# Patient Record
Sex: MALE | Race: WHITE | NOT HISPANIC OR LATINO | Employment: OTHER | URBAN - NONMETROPOLITAN AREA
[De-identification: names, ages, dates, MRNs, and addresses within clinical notes are randomized per-mention and may not be internally consistent; named-entity substitution may affect disease eponyms.]

---

## 2018-05-10 ENCOUNTER — HISTORICAL (OUTPATIENT)
Dept: ADMINISTRATIVE | Facility: HOSPITAL | Age: 58
End: 2018-05-10

## 2018-12-22 ENCOUNTER — HISTORICAL (OUTPATIENT)
Dept: ADMINISTRATIVE | Facility: HOSPITAL | Age: 58
End: 2018-12-22

## 2019-08-28 ENCOUNTER — HISTORICAL (OUTPATIENT)
Dept: ADMINISTRATIVE | Facility: HOSPITAL | Age: 59
End: 2019-08-28

## 2019-08-29 ENCOUNTER — HISTORICAL (OUTPATIENT)
Dept: ADMINISTRATIVE | Facility: HOSPITAL | Age: 59
End: 2019-08-29

## 2021-05-21 ENCOUNTER — HISTORICAL (OUTPATIENT)
Dept: ADMINISTRATIVE | Facility: HOSPITAL | Age: 61
End: 2021-05-21

## 2021-07-27 ENCOUNTER — HISTORICAL (OUTPATIENT)
Dept: ADMINISTRATIVE | Facility: HOSPITAL | Age: 61
End: 2021-07-27

## 2021-07-29 ENCOUNTER — HISTORICAL (OUTPATIENT)
Dept: ADMINISTRATIVE | Facility: HOSPITAL | Age: 61
End: 2021-07-29

## 2021-07-30 ENCOUNTER — HISTORICAL (OUTPATIENT)
Dept: ADMINISTRATIVE | Facility: HOSPITAL | Age: 61
End: 2021-07-30

## 2022-05-20 ENCOUNTER — HISTORICAL (OUTPATIENT)
Dept: ADMINISTRATIVE | Facility: HOSPITAL | Age: 62
End: 2022-05-20

## 2022-12-13 ENCOUNTER — HISTORICAL (OUTPATIENT)
Dept: ADMINISTRATIVE | Facility: HOSPITAL | Age: 62
End: 2022-12-13

## 2023-01-13 ENCOUNTER — HISTORICAL (OUTPATIENT)
Dept: ADMINISTRATIVE | Facility: HOSPITAL | Age: 63
End: 2023-01-13

## 2023-02-20 ENCOUNTER — LAB VISIT (OUTPATIENT)
Dept: LAB | Facility: HOSPITAL | Age: 63
End: 2023-02-20
Attending: UROLOGY
Payer: MEDICARE

## 2023-02-20 DIAGNOSIS — N40.1 BENIGN LOCALIZED PROSTATIC HYPERPLASIA WITH LOWER URINARY TRACT SYMPTOMS (LUTS): Primary | ICD-10-CM

## 2023-02-20 LAB
ANION GAP SERPL CALC-SCNC: 6 MEQ/L (ref 2–13)
BUN SERPL-MCNC: 9 MG/DL (ref 7–20)
CALCIUM SERPL-MCNC: 8.9 MG/DL (ref 8.4–10.2)
CHLORIDE SERPL-SCNC: 99 MMOL/L (ref 98–110)
CO2 SERPL-SCNC: 33 MMOL/L (ref 21–32)
CREAT SERPL-MCNC: 0.91 MG/DL (ref 0.66–1.25)
CREAT/UREA NIT SERPL: 10 (ref 12–20)
GFR SERPLBLD CREATININE-BSD FMLA CKD-EPI: >90 MLS/MIN/1.73/M2
GLUCOSE SERPL-MCNC: 186 MG/DL (ref 70–115)
POTASSIUM SERPL-SCNC: 4.5 MMOL/L (ref 3.5–5.1)
SODIUM SERPL-SCNC: 138 MMOL/L (ref 135–145)

## 2023-02-20 PROCEDURE — 36415 COLL VENOUS BLD VENIPUNCTURE: CPT

## 2023-02-20 PROCEDURE — 80048 BASIC METABOLIC PNL TOTAL CA: CPT

## 2023-04-24 ENCOUNTER — LAB VISIT (OUTPATIENT)
Dept: LAB | Facility: HOSPITAL | Age: 63
End: 2023-04-24
Attending: FAMILY MEDICINE
Payer: MEDICARE

## 2023-04-24 DIAGNOSIS — R33.8 ENLARGED PROSTATE WITH URINARY RETENTION: ICD-10-CM

## 2023-04-24 DIAGNOSIS — Z00.01 ENCOUNTER FOR GENERAL ADULT MEDICAL EXAMINATION WITH ABNORMAL FINDINGS: ICD-10-CM

## 2023-04-24 DIAGNOSIS — N40.1 ENLARGED PROSTATE WITH URINARY RETENTION: ICD-10-CM

## 2023-04-24 DIAGNOSIS — I49.9 VENTRICULAR ARRHYTHMIA: ICD-10-CM

## 2023-04-24 DIAGNOSIS — E11.9 DIABETES MELLITUS WITHOUT COMPLICATION: Primary | ICD-10-CM

## 2023-04-24 LAB
ALBUMIN SERPL-MCNC: 4 G/DL (ref 3.4–5)
ALBUMIN/GLOB SERPL: 1.5 RATIO
ALP SERPL-CCNC: 58 UNIT/L (ref 50–144)
ALT SERPL-CCNC: 13 UNIT/L (ref 1–45)
ANION GAP SERPL CALC-SCNC: 6 MEQ/L (ref 2–13)
AST SERPL-CCNC: 17 UNIT/L (ref 17–59)
BASOPHILS # BLD AUTO: 0.04 X10(3)/MCL (ref 0.01–0.08)
BASOPHILS NFR BLD AUTO: 0.8 % (ref 0.1–1.2)
BILIRUBIN DIRECT+TOT PNL SERPL-MCNC: 0.4 MG/DL (ref 0–1)
BUN SERPL-MCNC: 16 MG/DL (ref 7–20)
CALCIUM SERPL-MCNC: 9.2 MG/DL (ref 8.4–10.2)
CHLORIDE SERPL-SCNC: 100 MMOL/L (ref 98–110)
CHOLEST SERPL-MCNC: 185 MG/DL (ref 0–200)
CO2 SERPL-SCNC: 32 MMOL/L (ref 21–32)
CREAT SERPL-MCNC: 1.14 MG/DL (ref 0.66–1.25)
CREAT/UREA NIT SERPL: 14 (ref 12–20)
EOSINOPHIL # BLD AUTO: 0.04 X10(3)/MCL (ref 0.04–0.54)
EOSINOPHIL NFR BLD AUTO: 0.8 % (ref 0.7–7)
ERYTHROCYTE [DISTWIDTH] IN BLOOD BY AUTOMATED COUNT: 11.2 % (ref 11.6–14.4)
GFR SERPLBLD CREATININE-BSD FMLA CKD-EPI: 73 MLS/MIN/1.73/M2
GLOBULIN SER-MCNC: 2.6 GM/DL (ref 2–3.9)
GLUCOSE SERPL-MCNC: 160 MG/DL (ref 70–115)
HCT VFR BLD AUTO: 36.5 % (ref 36–52)
HDLC SERPL-MCNC: 66 MG/DL (ref 40–60)
HGB BLD-MCNC: 12.8 G/DL (ref 13–18)
IMM GRANULOCYTES # BLD AUTO: 0.01 X10(3)/MCL (ref 0–0.03)
IMM GRANULOCYTES NFR BLD AUTO: 0.2 % (ref 0–0.5)
LDLC SERPL DIRECT ASSAY-SCNC: 89 MG/DL (ref 30–100)
LYMPHOCYTES # BLD AUTO: 1.17 X10(3)/MCL (ref 1.32–3.57)
LYMPHOCYTES NFR BLD AUTO: 23.9 % (ref 20–55)
MCH RBC QN AUTO: 31.6 PG (ref 27–34)
MCV RBC AUTO: 90.1 FL (ref 79–99)
MEAN CELL HEMOGLOBIN CONCENTRATION (OHS) G/DL: 35.1 G/DL (ref 31–37)
MONOCYTES # BLD AUTO: 0.17 X10(3)/MCL (ref 0.3–0.82)
MONOCYTES NFR BLD AUTO: 3.5 % (ref 4.7–12.5)
NEUTROPHILS # BLD AUTO: 3.47 X10(3)/MCL (ref 1.78–5.38)
NEUTROPHILS NFR BLD AUTO: 70.8 % (ref 37–73)
NRBC BLD AUTO-RTO: 0 % (ref 0–1)
PLATELET # BLD AUTO: 103 X10(3)/MCL (ref 140–371)
PMV BLD AUTO: 8.5 FL (ref 9.4–12.4)
POTASSIUM SERPL-SCNC: 4 MMOL/L (ref 3.5–5.1)
PROT SERPL-MCNC: 6.6 GM/DL (ref 6.3–8.2)
PSA SERPL-MCNC: 0.67 NG/ML
RBC # BLD AUTO: 4.05 X10(6)/MCL (ref 4–6)
SODIUM SERPL-SCNC: 138 MMOL/L (ref 135–145)
TRIGL SERPL-MCNC: 113 MG/DL (ref 30–200)
WBC # SPEC AUTO: 4.9 X10(3)/MCL (ref 4–11.5)

## 2023-04-24 PROCEDURE — 80061 LIPID PANEL: CPT

## 2023-04-24 PROCEDURE — 36415 COLL VENOUS BLD VENIPUNCTURE: CPT

## 2023-04-24 PROCEDURE — 84153 ASSAY OF PSA TOTAL: CPT

## 2023-04-24 PROCEDURE — 85025 COMPLETE CBC W/AUTO DIFF WBC: CPT

## 2023-04-24 PROCEDURE — 80053 COMPREHEN METABOLIC PANEL: CPT

## 2023-04-29 ENCOUNTER — HOSPITAL ENCOUNTER (INPATIENT)
Facility: HOSPITAL | Age: 63
LOS: 2 days | Discharge: HOME | DRG: 392 | End: 2023-05-01
Attending: FAMILY MEDICINE | Admitting: FAMILY MEDICINE
Payer: MEDICARE

## 2023-04-29 DIAGNOSIS — Z46.59 ENCOUNTER FOR FEEDING TUBE PLACEMENT: ICD-10-CM

## 2023-04-29 DIAGNOSIS — K56.609 SBO (SMALL BOWEL OBSTRUCTION): Primary | ICD-10-CM

## 2023-04-29 PROBLEM — K59.00 CONSTIPATION: Status: ACTIVE | Noted: 2023-04-29

## 2023-04-29 LAB
ALBUMIN SERPL-MCNC: 4.1 G/DL (ref 3.4–5)
ALBUMIN SERPL-MCNC: 4.5 G/DL (ref 3.4–5)
ALBUMIN/GLOB SERPL: 1.6 RATIO
ALBUMIN/GLOB SERPL: 1.7 RATIO
ALP SERPL-CCNC: 50 UNIT/L (ref 50–144)
ALP SERPL-CCNC: 55 UNIT/L (ref 50–144)
ALT SERPL-CCNC: 16 UNIT/L (ref 1–45)
ALT SERPL-CCNC: 16 UNIT/L (ref 1–45)
ANION GAP SERPL CALC-SCNC: 7 MEQ/L (ref 2–13)
ANION GAP SERPL CALC-SCNC: 8 MEQ/L (ref 2–13)
AST SERPL-CCNC: 18 UNIT/L (ref 17–59)
AST SERPL-CCNC: 19 UNIT/L (ref 17–59)
BASOPHILS # BLD AUTO: 0.03 X10(3)/MCL (ref 0.01–0.08)
BASOPHILS # BLD AUTO: 0.04 X10(3)/MCL (ref 0.01–0.08)
BASOPHILS NFR BLD AUTO: 0.4 % (ref 0.1–1.2)
BASOPHILS NFR BLD AUTO: 0.5 % (ref 0.1–1.2)
BILIRUBIN DIRECT+TOT PNL SERPL-MCNC: 0.8 MG/DL (ref 0–1)
BILIRUBIN DIRECT+TOT PNL SERPL-MCNC: 0.8 MG/DL (ref 0–1)
BUN SERPL-MCNC: 19 MG/DL (ref 7–20)
BUN SERPL-MCNC: 19 MG/DL (ref 7–20)
CALCIUM SERPL-MCNC: 9 MG/DL (ref 8.4–10.2)
CALCIUM SERPL-MCNC: 9.4 MG/DL (ref 8.4–10.2)
CHLORIDE SERPL-SCNC: 100 MMOL/L (ref 98–110)
CHLORIDE SERPL-SCNC: 99 MMOL/L (ref 98–110)
CO2 SERPL-SCNC: 29 MMOL/L (ref 21–32)
CO2 SERPL-SCNC: 30 MMOL/L (ref 21–32)
CREAT SERPL-MCNC: 0.97 MG/DL (ref 0.66–1.25)
CREAT SERPL-MCNC: 1.05 MG/DL (ref 0.66–1.25)
CREAT/UREA NIT SERPL: 18 (ref 12–20)
CREAT/UREA NIT SERPL: 20 (ref 12–20)
EOSINOPHIL # BLD AUTO: 0.06 X10(3)/MCL (ref 0.04–0.54)
EOSINOPHIL # BLD AUTO: 0.07 X10(3)/MCL (ref 0.04–0.54)
EOSINOPHIL NFR BLD AUTO: 0.8 % (ref 0.7–7)
EOSINOPHIL NFR BLD AUTO: 1 % (ref 0.7–7)
ERYTHROCYTE [DISTWIDTH] IN BLOOD BY AUTOMATED COUNT: 10.9 % (ref 11.6–14.4)
ERYTHROCYTE [DISTWIDTH] IN BLOOD BY AUTOMATED COUNT: 11 % (ref 11.6–14.4)
GFR SERPLBLD CREATININE-BSD FMLA CKD-EPI: 80 MLS/MIN/1.73/M2
GFR SERPLBLD CREATININE-BSD FMLA CKD-EPI: 88 MLS/MIN/1.73/M2
GLOBULIN SER-MCNC: 2.4 GM/DL (ref 2–3.9)
GLOBULIN SER-MCNC: 2.9 GM/DL (ref 2–3.9)
GLUCOSE SERPL-MCNC: 133 MG/DL (ref 70–115)
GLUCOSE SERPL-MCNC: 147 MG/DL (ref 70–115)
HCT VFR BLD AUTO: 35.4 % (ref 36–52)
HCT VFR BLD AUTO: 35.9 % (ref 36–52)
HGB BLD-MCNC: 13 G/DL (ref 13–18)
HGB BLD-MCNC: 13.3 G/DL (ref 13–18)
IMM GRANULOCYTES # BLD AUTO: 0.02 X10(3)/MCL (ref 0–0.03)
IMM GRANULOCYTES # BLD AUTO: 0.06 X10(3)/MCL (ref 0–0.03)
IMM GRANULOCYTES NFR BLD AUTO: 0.3 % (ref 0–0.5)
IMM GRANULOCYTES NFR BLD AUTO: 0.8 % (ref 0–0.5)
LIPASE SERPL-CCNC: <20 U/L (ref 23–300)
LYMPHOCYTES # BLD AUTO: 1.08 X10(3)/MCL (ref 1.32–3.57)
LYMPHOCYTES # BLD AUTO: 1.1 X10(3)/MCL (ref 1.32–3.57)
LYMPHOCYTES NFR BLD AUTO: 14.8 % (ref 20–55)
LYMPHOCYTES NFR BLD AUTO: 15.1 % (ref 20–55)
MCH RBC QN AUTO: 31.6 PG (ref 27–34)
MCH RBC QN AUTO: 32.1 PG (ref 27–34)
MCV RBC AUTO: 86.1 FL (ref 79–99)
MCV RBC AUTO: 86.7 FL (ref 79–99)
MEAN CELL HEMOGLOBIN CONCENTRATION (OHS) G/DL: 36.7 G/DL (ref 31–37)
MEAN CELL HEMOGLOBIN CONCENTRATION (OHS) G/DL: 37 G/DL (ref 31–37)
MONOCYTES # BLD AUTO: 0.47 X10(3)/MCL (ref 0.3–0.82)
MONOCYTES # BLD AUTO: 0.61 X10(3)/MCL (ref 0.3–0.82)
MONOCYTES NFR BLD AUTO: 6.3 % (ref 4.7–12.5)
MONOCYTES NFR BLD AUTO: 8.5 % (ref 4.7–12.5)
NEUTROPHILS # BLD AUTO: 5.3 X10(3)/MCL (ref 1.78–5.38)
NEUTROPHILS # BLD AUTO: 5.74 X10(3)/MCL (ref 1.78–5.38)
NEUTROPHILS NFR BLD AUTO: 74.2 % (ref 37–73)
NEUTROPHILS NFR BLD AUTO: 77.3 % (ref 37–73)
NRBC BLD AUTO-RTO: 0 % (ref 0–1)
NRBC BLD AUTO-RTO: 0 % (ref 0–1)
PLATELET # BLD AUTO: 86 X10(3)/MCL (ref 140–371)
PLATELET # BLD AUTO: 90 X10(3)/MCL (ref 140–371)
PLATELET # BLD EST: ABNORMAL 10*3/UL
PLATELET # BLD EST: ABNORMAL 10*3/UL
PMV BLD AUTO: 9.1 FL (ref 9.4–12.4)
PMV BLD AUTO: 9.1 FL (ref 9.4–12.4)
POCT GLUCOSE: 119 MG/DL (ref 70–110)
POCT GLUCOSE: 122 MG/DL (ref 70–110)
POCT GLUCOSE: 145 MG/DL (ref 70–110)
POTASSIUM SERPL-SCNC: 4.1 MMOL/L (ref 3.5–5.1)
POTASSIUM SERPL-SCNC: 4.3 MMOL/L (ref 3.5–5.1)
PROT SERPL-MCNC: 6.5 GM/DL (ref 6.3–8.2)
PROT SERPL-MCNC: 7.4 GM/DL (ref 6.3–8.2)
RBC # BLD AUTO: 4.11 X10(6)/MCL (ref 4–6)
RBC # BLD AUTO: 4.14 X10(6)/MCL (ref 4–6)
SODIUM SERPL-SCNC: 136 MMOL/L (ref 135–145)
SODIUM SERPL-SCNC: 137 MMOL/L (ref 135–145)
WBC # SPEC AUTO: 7.2 X10(3)/MCL (ref 4–11.5)
WBC # SPEC AUTO: 7.4 X10(3)/MCL (ref 4–11.5)

## 2023-04-29 PROCEDURE — 25000003 PHARM REV CODE 250: Performed by: FAMILY MEDICINE

## 2023-04-29 PROCEDURE — 11000001 HC ACUTE MED/SURG PRIVATE ROOM

## 2023-04-29 PROCEDURE — 36415 COLL VENOUS BLD VENIPUNCTURE: CPT | Performed by: FAMILY MEDICINE

## 2023-04-29 PROCEDURE — 96375 TX/PRO/DX INJ NEW DRUG ADDON: CPT

## 2023-04-29 PROCEDURE — 25000003 PHARM REV CODE 250: Performed by: INTERNAL MEDICINE

## 2023-04-29 PROCEDURE — 80053 COMPREHEN METABOLIC PANEL: CPT | Performed by: FAMILY MEDICINE

## 2023-04-29 PROCEDURE — 99285 EMERGENCY DEPT VISIT HI MDM: CPT | Mod: 25

## 2023-04-29 PROCEDURE — 63600175 PHARM REV CODE 636 W HCPCS: Performed by: FAMILY MEDICINE

## 2023-04-29 PROCEDURE — 83690 ASSAY OF LIPASE: CPT | Performed by: FAMILY MEDICINE

## 2023-04-29 PROCEDURE — 96361 HYDRATE IV INFUSION ADD-ON: CPT

## 2023-04-29 PROCEDURE — 94760 N-INVAS EAR/PLS OXIMETRY 1: CPT

## 2023-04-29 PROCEDURE — 63600175 PHARM REV CODE 636 W HCPCS: Performed by: INTERNAL MEDICINE

## 2023-04-29 PROCEDURE — 96374 THER/PROPH/DIAG INJ IV PUSH: CPT

## 2023-04-29 PROCEDURE — 85025 COMPLETE CBC W/AUTO DIFF WBC: CPT | Performed by: FAMILY MEDICINE

## 2023-04-29 PROCEDURE — 25000003 PHARM REV CODE 250: Performed by: SURGERY

## 2023-04-29 RX ORDER — MELOXICAM 7.5 MG/1
7.5 TABLET ORAL ONCE
Status: DISCONTINUED | OUTPATIENT
Start: 2023-04-29 | End: 2023-05-01 | Stop reason: HOSPADM

## 2023-04-29 RX ORDER — MORPHINE SULFATE 4 MG/ML
3 INJECTION, SOLUTION INTRAMUSCULAR; INTRAVENOUS EVERY 4 HOURS PRN
Status: DISCONTINUED | OUTPATIENT
Start: 2023-04-29 | End: 2023-05-01 | Stop reason: HOSPADM

## 2023-04-29 RX ORDER — TAMSULOSIN HYDROCHLORIDE 0.4 MG/1
1 CAPSULE ORAL DAILY
COMMUNITY

## 2023-04-29 RX ORDER — OXYCODONE AND ACETAMINOPHEN 10; 325 MG/1; MG/1
10 TABLET ORAL
COMMUNITY

## 2023-04-29 RX ORDER — OMEPRAZOLE 20 MG/1
20 CAPSULE, DELAYED RELEASE ORAL DAILY
COMMUNITY

## 2023-04-29 RX ORDER — ONDANSETRON 2 MG/ML
4 INJECTION INTRAMUSCULAR; INTRAVENOUS
Status: COMPLETED | OUTPATIENT
Start: 2023-04-29 | End: 2023-04-29

## 2023-04-29 RX ORDER — DEXTROSE MONOHYDRATE 100 MG/ML
12.5 INJECTION, SOLUTION INTRAVENOUS
Status: DISCONTINUED | OUTPATIENT
Start: 2023-04-29 | End: 2023-05-01 | Stop reason: HOSPADM

## 2023-04-29 RX ORDER — DULOXETIN HYDROCHLORIDE 60 MG/1
60 CAPSULE, DELAYED RELEASE ORAL 2 TIMES DAILY
COMMUNITY
Start: 2023-03-13

## 2023-04-29 RX ORDER — GLIMEPIRIDE 2 MG/1
2 TABLET ORAL DAILY
COMMUNITY

## 2023-04-29 RX ORDER — ONDANSETRON 2 MG/ML
4 INJECTION INTRAMUSCULAR; INTRAVENOUS EVERY 6 HOURS PRN
Status: DISCONTINUED | OUTPATIENT
Start: 2023-04-29 | End: 2023-05-01 | Stop reason: HOSPADM

## 2023-04-29 RX ORDER — FUROSEMIDE 40 MG/1
40 TABLET ORAL DAILY
Status: DISCONTINUED | OUTPATIENT
Start: 2023-04-29 | End: 2023-05-01 | Stop reason: HOSPADM

## 2023-04-29 RX ORDER — MECLIZINE HYDROCHLORIDE 25 MG/1
25 TABLET ORAL DAILY
COMMUNITY
Start: 2023-02-13

## 2023-04-29 RX ORDER — TAMSULOSIN HYDROCHLORIDE 0.4 MG/1
1 CAPSULE ORAL DAILY
Status: DISCONTINUED | OUTPATIENT
Start: 2023-04-29 | End: 2023-05-01 | Stop reason: HOSPADM

## 2023-04-29 RX ORDER — MECLIZINE HCL 12.5 MG 12.5 MG/1
25 TABLET ORAL DAILY
Status: DISCONTINUED | OUTPATIENT
Start: 2023-04-29 | End: 2023-05-01 | Stop reason: HOSPADM

## 2023-04-29 RX ORDER — QUETIAPINE FUMARATE 300 MG/1
300 TABLET, FILM COATED ORAL 2 TIMES DAILY
COMMUNITY

## 2023-04-29 RX ORDER — HYDROMORPHONE HYDROCHLORIDE 1 MG/ML
1 INJECTION, SOLUTION INTRAMUSCULAR; INTRAVENOUS; SUBCUTANEOUS
Status: COMPLETED | OUTPATIENT
Start: 2023-04-29 | End: 2023-04-29

## 2023-04-29 RX ORDER — ONDANSETRON 2 MG/ML
4 INJECTION INTRAMUSCULAR; INTRAVENOUS EVERY 4 HOURS PRN
Status: DISCONTINUED | OUTPATIENT
Start: 2023-04-29 | End: 2023-04-29

## 2023-04-29 RX ORDER — GLIMEPIRIDE 2 MG/1
2 TABLET ORAL DAILY
Status: DISCONTINUED | OUTPATIENT
Start: 2023-04-29 | End: 2023-05-01 | Stop reason: HOSPADM

## 2023-04-29 RX ORDER — TERBINAFINE HYDROCHLORIDE 250 MG/1
250 TABLET ORAL DAILY
COMMUNITY

## 2023-04-29 RX ORDER — GABAPENTIN 300 MG/1
300 CAPSULE ORAL 3 TIMES DAILY
COMMUNITY
Start: 2023-03-10

## 2023-04-29 RX ORDER — BUSPIRONE HYDROCHLORIDE 15 MG/1
15 TABLET ORAL 3 TIMES DAILY
COMMUNITY
Start: 2023-04-17

## 2023-04-29 RX ORDER — TRAZODONE HYDROCHLORIDE 50 MG/1
150 TABLET ORAL 2 TIMES DAILY
Status: DISCONTINUED | OUTPATIENT
Start: 2023-04-29 | End: 2023-04-29

## 2023-04-29 RX ORDER — MEMANTINE HYDROCHLORIDE 5 MG/1
5 TABLET ORAL DAILY
Status: DISCONTINUED | OUTPATIENT
Start: 2023-04-29 | End: 2023-05-01 | Stop reason: HOSPADM

## 2023-04-29 RX ORDER — GABAPENTIN 300 MG/1
300 CAPSULE ORAL 3 TIMES DAILY
Status: DISCONTINUED | OUTPATIENT
Start: 2023-04-29 | End: 2023-05-01 | Stop reason: HOSPADM

## 2023-04-29 RX ORDER — TRAZODONE HYDROCHLORIDE 150 MG/1
150 TABLET ORAL NIGHTLY
COMMUNITY

## 2023-04-29 RX ORDER — TRAZODONE HYDROCHLORIDE 50 MG/1
150 TABLET ORAL NIGHTLY
Status: DISCONTINUED | OUTPATIENT
Start: 2023-04-29 | End: 2023-05-01 | Stop reason: HOSPADM

## 2023-04-29 RX ORDER — QUETIAPINE FUMARATE 100 MG/1
300 TABLET, FILM COATED ORAL 2 TIMES DAILY
Status: DISCONTINUED | OUTPATIENT
Start: 2023-04-29 | End: 2023-05-01 | Stop reason: HOSPADM

## 2023-04-29 RX ORDER — CLONIDINE HYDROCHLORIDE 0.1 MG/1
0.1 TABLET ORAL NIGHTLY
Status: DISCONTINUED | OUTPATIENT
Start: 2023-04-29 | End: 2023-05-01 | Stop reason: HOSPADM

## 2023-04-29 RX ORDER — TADALAFIL 5 MG/1
5 TABLET ORAL
COMMUNITY
Start: 2022-05-12

## 2023-04-29 RX ORDER — MELOXICAM 7.5 MG/1
7.5 TABLET ORAL 2 TIMES DAILY PRN
COMMUNITY
Start: 2022-12-27

## 2023-04-29 RX ORDER — RANOLAZINE 500 MG/1
1000 TABLET, EXTENDED RELEASE ORAL 2 TIMES DAILY
Status: DISCONTINUED | OUTPATIENT
Start: 2023-04-29 | End: 2023-05-01 | Stop reason: HOSPADM

## 2023-04-29 RX ORDER — LACTULOSE 10 G/15ML
15 SOLUTION ORAL 3 TIMES DAILY
Status: DISCONTINUED | OUTPATIENT
Start: 2023-04-29 | End: 2023-05-01 | Stop reason: HOSPADM

## 2023-04-29 RX ORDER — BACLOFEN 10 MG/1
10 TABLET ORAL 3 TIMES DAILY
Status: DISCONTINUED | OUTPATIENT
Start: 2023-04-29 | End: 2023-05-01 | Stop reason: HOSPADM

## 2023-04-29 RX ORDER — POLYETHYLENE GLYCOL 3350 17 G/17G
17 POWDER, FOR SOLUTION ORAL
Status: COMPLETED | OUTPATIENT
Start: 2023-04-29 | End: 2023-04-30

## 2023-04-29 RX ORDER — BUSPIRONE HYDROCHLORIDE 15 MG/1
15 TABLET ORAL 3 TIMES DAILY
Status: DISCONTINUED | OUTPATIENT
Start: 2023-04-29 | End: 2023-05-01 | Stop reason: HOSPADM

## 2023-04-29 RX ORDER — BACLOFEN 10 MG/1
10 TABLET ORAL 3 TIMES DAILY
COMMUNITY
Start: 2023-04-06

## 2023-04-29 RX ORDER — MEMANTINE HYDROCHLORIDE 5 MG/1
5 TABLET ORAL DAILY
COMMUNITY
Start: 2023-04-10

## 2023-04-29 RX ORDER — INSULIN ASPART 100 [IU]/ML
0-5 INJECTION, SOLUTION INTRAVENOUS; SUBCUTANEOUS EVERY 6 HOURS PRN
Status: DISCONTINUED | OUTPATIENT
Start: 2023-04-29 | End: 2023-05-01 | Stop reason: HOSPADM

## 2023-04-29 RX ORDER — BISACODYL 10 MG
10 SUPPOSITORY, RECTAL RECTAL DAILY PRN
Status: DISCONTINUED | OUTPATIENT
Start: 2023-04-29 | End: 2023-05-01 | Stop reason: HOSPADM

## 2023-04-29 RX ORDER — FUROSEMIDE 40 MG/1
40 TABLET ORAL DAILY
COMMUNITY
Start: 2023-02-08

## 2023-04-29 RX ORDER — GLUCAGON 1 MG
1 KIT INJECTION
Status: DISCONTINUED | OUTPATIENT
Start: 2023-04-29 | End: 2023-05-01 | Stop reason: HOSPADM

## 2023-04-29 RX ORDER — DULOXETIN HYDROCHLORIDE 30 MG/1
60 CAPSULE, DELAYED RELEASE ORAL 2 TIMES DAILY
Status: DISCONTINUED | OUTPATIENT
Start: 2023-04-29 | End: 2023-05-01 | Stop reason: HOSPADM

## 2023-04-29 RX ORDER — SODIUM CHLORIDE 9 MG/ML
INJECTION, SOLUTION INTRAVENOUS CONTINUOUS
Status: DISCONTINUED | OUTPATIENT
Start: 2023-04-29 | End: 2023-05-01 | Stop reason: HOSPADM

## 2023-04-29 RX ORDER — MORPHINE SULFATE 4 MG/ML
3 INJECTION, SOLUTION INTRAMUSCULAR; INTRAVENOUS EVERY 4 HOURS PRN
Status: DISCONTINUED | OUTPATIENT
Start: 2023-04-29 | End: 2023-04-29

## 2023-04-29 RX ORDER — CLONIDINE HYDROCHLORIDE 0.1 MG/1
0.1 TABLET ORAL NIGHTLY
COMMUNITY
Start: 2022-11-21

## 2023-04-29 RX ORDER — OXYCODONE AND ACETAMINOPHEN 10; 325 MG/1; MG/1
1 TABLET ORAL EVERY 6 HOURS PRN
Status: DISCONTINUED | OUTPATIENT
Start: 2023-04-29 | End: 2023-05-01 | Stop reason: HOSPADM

## 2023-04-29 RX ORDER — RANOLAZINE 1000 MG/1
1000 TABLET, EXTENDED RELEASE ORAL 2 TIMES DAILY
COMMUNITY

## 2023-04-29 RX ORDER — PANTOPRAZOLE SODIUM 40 MG/1
40 TABLET, DELAYED RELEASE ORAL DAILY
Status: DISCONTINUED | OUTPATIENT
Start: 2023-04-29 | End: 2023-05-01 | Stop reason: HOSPADM

## 2023-04-29 RX ADMIN — LACTULOSE 15 G: 20 SOLUTION ORAL at 04:04

## 2023-04-29 RX ADMIN — TRAZODONE HYDROCHLORIDE 150 MG: 50 TABLET ORAL at 08:04

## 2023-04-29 RX ADMIN — SODIUM CHLORIDE 1000 ML: 9 INJECTION, SOLUTION INTRAVENOUS at 01:04

## 2023-04-29 RX ADMIN — POLYETHYLENE GLYCOL 3350 17 G: 17 POWDER, FOR SOLUTION ORAL at 09:04

## 2023-04-29 RX ADMIN — POLYETHYLENE GLYCOL 3350 17 G: 17 POWDER, FOR SOLUTION ORAL at 11:04

## 2023-04-29 RX ADMIN — SODIUM CHLORIDE: 9 INJECTION, SOLUTION INTRAVENOUS at 08:04

## 2023-04-29 RX ADMIN — MEMANTINE 5 MG: 5 TABLET ORAL at 01:04

## 2023-04-29 RX ADMIN — POLYETHYLENE GLYCOL 3350 17 G: 17 POWDER, FOR SOLUTION ORAL at 10:04

## 2023-04-29 RX ADMIN — BUSPIRONE HYDROCHLORIDE 15 MG: 15 TABLET ORAL at 08:04

## 2023-04-29 RX ADMIN — GLIMEPIRIDE 2 MG: 2 TABLET ORAL at 01:04

## 2023-04-29 RX ADMIN — BACLOFEN 10 MG: 10 TABLET ORAL at 08:04

## 2023-04-29 RX ADMIN — DULOXETINE 60 MG: 30 CAPSULE, DELAYED RELEASE ORAL at 01:04

## 2023-04-29 RX ADMIN — OXYCODONE AND ACETAMINOPHEN 1 TABLET: 10; 325 TABLET ORAL at 01:04

## 2023-04-29 RX ADMIN — POLYETHYLENE GLYCOL 3350 17 G: 17 POWDER, FOR SOLUTION ORAL at 06:04

## 2023-04-29 RX ADMIN — CLONIDINE HYDROCHLORIDE 0.1 MG: 0.1 TABLET ORAL at 08:04

## 2023-04-29 RX ADMIN — QUETIAPINE FUMARATE 300 MG: 100 TABLET ORAL at 08:04

## 2023-04-29 RX ADMIN — ONDANSETRON 4 MG: 2 INJECTION INTRAMUSCULAR; INTRAVENOUS at 01:04

## 2023-04-29 RX ADMIN — GABAPENTIN 300 MG: 300 CAPSULE ORAL at 08:04

## 2023-04-29 RX ADMIN — GABAPENTIN 300 MG: 300 CAPSULE ORAL at 04:04

## 2023-04-29 RX ADMIN — BUSPIRONE HYDROCHLORIDE 15 MG: 15 TABLET ORAL at 04:04

## 2023-04-29 RX ADMIN — TAMSULOSIN HYDROCHLORIDE 0.4 MG: 0.4 CAPSULE ORAL at 01:04

## 2023-04-29 RX ADMIN — QUETIAPINE FUMARATE 300 MG: 100 TABLET ORAL at 01:04

## 2023-04-29 RX ADMIN — MORPHINE SULFATE 3 MG: 4 INJECTION, SOLUTION INTRAMUSCULAR; INTRAVENOUS at 10:04

## 2023-04-29 RX ADMIN — DULOXETINE 60 MG: 30 CAPSULE, DELAYED RELEASE ORAL at 08:04

## 2023-04-29 RX ADMIN — LACTULOSE 15 G: 20 SOLUTION ORAL at 08:04

## 2023-04-29 RX ADMIN — PIPERACILLIN AND TAZOBACTAM 4.5 G: 4; .5 INJECTION, POWDER, FOR SOLUTION INTRAVENOUS; PARENTERAL at 03:04

## 2023-04-29 RX ADMIN — POLYETHYLENE GLYCOL 3350 17 G: 17 POWDER, FOR SOLUTION ORAL at 08:04

## 2023-04-29 RX ADMIN — SODIUM CHLORIDE: 9 INJECTION, SOLUTION INTRAVENOUS at 04:04

## 2023-04-29 RX ADMIN — RANOLAZINE 1000 MG: 500 TABLET, FILM COATED, EXTENDED RELEASE ORAL at 01:04

## 2023-04-29 RX ADMIN — PANTOPRAZOLE SODIUM 40 MG: 40 TABLET, DELAYED RELEASE ORAL at 01:04

## 2023-04-29 RX ADMIN — FUROSEMIDE 40 MG: 40 TABLET ORAL at 01:04

## 2023-04-29 RX ADMIN — MECLIZINE 25 MG: 12.5 TABLET ORAL at 01:04

## 2023-04-29 RX ADMIN — HYDROMORPHONE HYDROCHLORIDE 1 MG: 1 INJECTION, SOLUTION INTRAMUSCULAR; INTRAVENOUS; SUBCUTANEOUS at 01:04

## 2023-04-29 RX ADMIN — BACLOFEN 10 MG: 10 TABLET ORAL at 04:04

## 2023-04-29 RX ADMIN — RANOLAZINE 1000 MG: 500 TABLET, FILM COATED, EXTENDED RELEASE ORAL at 08:04

## 2023-04-29 NOTE — SUBJECTIVE & OBJECTIVE
History reviewed. No pertinent past medical history.    History reviewed. No pertinent surgical history.    Review of patient's allergies indicates:  No Known Allergies    No current facility-administered medications on file prior to encounter.     Current Outpatient Medications on File Prior to Encounter   Medication Sig    tadalafiL (CIALIS) 5 MG tablet Take 5 mg by mouth as needed.    baclofen (LIORESAL) 10 MG tablet Take 10 mg by mouth 3 (three) times daily.    busPIRone (BUSPAR) 15 MG tablet Take 15 mg by mouth 3 (three) times daily.    cloNIDine (CATAPRES) 0.1 MG tablet Take 0.1 mg by mouth every evening.    DULoxetine (CYMBALTA) 60 MG capsule Take 60 mg by mouth 2 (two) times a day.    furosemide (LASIX) 40 MG tablet Take 40 mg by mouth once daily.    gabapentin (NEURONTIN) 300 MG capsule Take 300 mg by mouth 3 (three) times daily.    glimepiride (AMARYL) 2 MG tablet Take 2 mg by mouth once daily.    meclizine (ANTIVERT) 25 mg tablet Take 25 mg by mouth once daily.    meloxicam (MOBIC) 7.5 MG tablet Take 7.5 mg by mouth 2 (two) times daily as needed.    memantine (NAMENDA) 5 MG Tab Take 5 mg by mouth once daily.    omeprazole (PRILOSEC) 20 MG capsule Take 20 mg by mouth once daily.    oxyCODONE-acetaminophen (PERCOCET)  mg per tablet Take 10 tablets by mouth every 6 to 8 hours as needed.    QUEtiapine (SEROQUEL) 300 MG Tab Take 300 mg by mouth 2 (two) times a day.    ranolazine (RANEXA) 1,000 mg Tb12 Take 1,000 mg by mouth 2 (two) times a day.    tamsulosin (FLOMAX) 0.4 mg Cap Take 1 capsule by mouth once daily.    terbinafine HCL (LAMISIL) 250 mg tablet Take 250 mg by mouth once daily.    traZODone (DESYREL) 150 MG tablet Take 150 mg by mouth 2 (two) times a day.     Family History    None       Tobacco Use    Smoking status: Not on file    Smokeless tobacco: Not on file   Substance and Sexual Activity    Alcohol use: Not on file    Drug use: Not on file    Sexual activity: Not on file     Review of  Systems   Constitutional: Negative.    HENT: Negative.     Eyes: Negative.    Respiratory: Negative.     Cardiovascular: Negative.    Gastrointestinal:  Positive for abdominal pain, nausea and vomiting.   Endocrine: Negative.    Genitourinary: Negative.    Musculoskeletal: Negative.    Skin: Negative.    Neurological: Negative.    Hematological: Negative.    Psychiatric/Behavioral: Negative.     Objective:     Vital Signs (Most Recent):  Temp: 98 °F (36.7 °C) (04/29/23 0121)  Pulse: 94 (04/29/23 0121)  Resp: 20 (04/29/23 0138)  BP: (!) 135/97 (04/29/23 0121)  SpO2: 98 % (04/29/23 0121)   Vital Signs (24h Range):  Temp:  [98 °F (36.7 °C)] 98 °F (36.7 °C)  Pulse:  [94] 94  Resp:  [18-20] 20  SpO2:  [98 %] 98 %  BP: (135)/(97) 135/97     Weight: 130.6 kg (288 lb)  Body mass index is 46.48 kg/m².    Physical Exam  Constitutional:       Appearance: Normal appearance. He is obese.   HENT:      Head: Normocephalic and atraumatic.      Mouth/Throat:      Pharynx: Oropharynx is clear.   Eyes:      Extraocular Movements: Extraocular movements intact.      Conjunctiva/sclera: Conjunctivae normal.      Pupils: Pupils are equal, round, and reactive to light.   Cardiovascular:      Rate and Rhythm: Normal rate and regular rhythm.      Pulses: Normal pulses.      Heart sounds: Normal heart sounds.   Pulmonary:      Effort: Pulmonary effort is normal.      Breath sounds: Normal breath sounds.   Abdominal:      General: Bowel sounds are normal. There is distension.      Palpations: Abdomen is soft.      Tenderness: There is abdominal tenderness.   Musculoskeletal:         General: Normal range of motion.      Cervical back: Normal range of motion and neck supple.   Skin:     General: Skin is warm and dry.   Neurological:      General: No focal deficit present.      Mental Status: He is alert and oriented to person, place, and time. Mental status is at baseline.   Psychiatric:         Mood and Affect: Mood normal.         CRANIAL  NERVES     CN III, IV, VI   Pupils are equal, round, and reactive to light.     Significant Labs: All pertinent labs within the past 24 hours have been reviewed.  A1C: No results for input(s): HGBA1C in the last 4320 hours.  ABGs: No results for input(s): PH, PCO2, HCO3, POCSATURATED, BE, TOTALHB, COHB, METHB, O2HB, POCFIO2, PO2 in the last 48 hours.  Bilirubin:   Recent Labs   Lab 04/24/23  0630 04/29/23 0135   BILITOT 0.4 0.8     Blood Culture: No results for input(s): LABBLOO in the last 48 hours.  BMP:   Recent Labs   Lab 04/29/23 0135      K 4.3   CO2 29   BUN 19.0   CREATININE 1.05   CALCIUM 9.4     CBC:   Recent Labs   Lab 04/29/23 0135   WBC 7.4   HGB 13.3   HCT 35.9*   PLT 90*     CMP:   Recent Labs   Lab 04/29/23 0135      K 4.3   CO2 29   BUN 19.0   CREATININE 1.05   CALCIUM 9.4   ALBUMIN 4.5   BILITOT 0.8   ALKPHOS 55   AST 19   ALT 16     Cardiac Markers: No results for input(s): CKMB, MYOGLOBIN, BNP, TROPISTAT in the last 48 hours.  Coagulation: No results for input(s): PT, INR, APTT in the last 48 hours.  Lactic Acid: No results for input(s): LACTATE in the last 48 hours.  Lipase:   Recent Labs   Lab 04/29/23 0135   LIPASE <20*     Lipid Panel: No results for input(s): CHOL, HDL, LDLCALC, TRIG, CHOLHDL in the last 48 hours.  Magnesium: No results for input(s): MG in the last 48 hours.  Pathology Results  (Last 10 years)      None          POCT Glucose: No results for input(s): POCTGLUCOSE in the last 48 hours.  Prealbumin: No results for input(s): PREALBUMIN in the last 48 hours.  Respiratory Culture: No results for input(s): GSRESP, RESPIRATORYC in the last 48 hours.  Troponin: No results for input(s): TROPONINI, TROPONINIHS in the last 48 hours.  TSH: No results for input(s): TSH in the last 4320 hours.  Urine Studies: No results for input(s): COLORU, APPEARANCEUA, PHUR, SPECGRAV, PROTEINUA, GLUCUA, KETONESU, BILIRUBINUA, OCCULTUA, NITRITE, UROBILINOGEN, LEUKOCYTESUR, RBCUA, WBCUA,  BACTERIA, SQUAMEPITHEL, HYALINECASTS in the last 48 hours.    Invalid input(s): ROMAN    Significant Imaging:    ABD CT  1. The cecum, ascending colon and transverse colon contain a large amount of formed stool and there is dilatation with fluid-filled loops of small bowel including the gastric lumen which can be seen with constipation.  There is also a swirling pattern involving the mesentery which could be the result of the constipation and dilatation however I would recommend clinical correlation and follow-up to exclude the possibility of a developing volvulus
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
## 2023-04-29 NOTE — ED PROVIDER NOTES
Encounter Date: 4/29/2023       History     Chief Complaint   Patient presents with    Abdominal Pain     PT C/O ABD PAIN WITH VOMITING.      Patient started with diffuse abdominal pain after dinner tonight.  His abdomen bloated up and he started throwing up bilious fluid.  He has been constipated for a few days.  He is got history of a cholecystectomy as well as a small-bowel obstruction which was handled nonsurgically.  His abdomen is painful and he is very nauseated.    The history is provided by the patient.   Review of patient's allergies indicates:  No Known Allergies  History reviewed. No pertinent past medical history.  History reviewed. No pertinent surgical history.  History reviewed. No pertinent family history.     Review of Systems   Constitutional:  Negative for fever.   HENT:  Negative for sore throat.    Respiratory:  Negative for shortness of breath.    Cardiovascular:  Negative for chest pain.   Gastrointestinal:  Positive for abdominal distention, abdominal pain and nausea.   Genitourinary:  Negative for dysuria.   Musculoskeletal:  Negative for back pain.   Skin:  Negative for rash.   Neurological:  Negative for weakness.   Hematological:  Does not bruise/bleed easily.     Physical Exam     Initial Vitals [04/29/23 0121]   BP Pulse Resp Temp SpO2   (!) 135/97 94 18 98 °F (36.7 °C) 98 %      MAP       --         Physical Exam    Nursing note and vitals reviewed.  Constitutional: Vital signs are normal. He is cooperative.  Non-toxic appearance. He does not appear ill.   Appears uncomfortable, but is in no distress. A+Ox4.    HENT:   Head: Normocephalic and atraumatic.   Eyes: Conjunctivae and lids are normal.   Neck: Trachea normal. Neck supple.   Cardiovascular:  Normal rate and regular rhythm.  No extrasystoles are present.          Pulmonary/Chest: Breath sounds normal.   Abdominal: He exhibits distension. There is abdominal tenderness.   Patient has got a moderately distended abdomen which is  tympanic.  It is tender all over no obvious rebound but he has some guarding.  He has high pitched bowel sounds throughout the belly.   Musculoskeletal:         General: Normal range of motion.      Cervical back: Neck supple.     Neurological: He is alert and oriented to person, place, and time. He has normal strength. No cranial nerve deficit or sensory deficit. He displays a negative Romberg sign.   Skin: Skin is warm, dry and intact. Capillary refill takes less than 2 seconds.   Psychiatric: He has a normal mood and affect. His speech is normal and behavior is normal. He is not actively hallucinating. He is attentive.       ED Course   Procedures  Labs Reviewed   COMPREHENSIVE METABOLIC PANEL - Abnormal; Notable for the following components:       Result Value    Glucose Level 147 (*)     All other components within normal limits   LIPASE - Abnormal; Notable for the following components:    Lipase Level <20 (*)     All other components within normal limits   CBC WITH DIFFERENTIAL - Abnormal; Notable for the following components:    Hct 35.9 (*)     RDW 11.0 (*)     Platelet 90 (*)     MPV 9.1 (*)     Neut % 77.3 (*)     Lymph % 14.8 (*)     Lymph # 1.10 (*)     Neut # 5.74 (*)     All other components within normal limits   BLOOD SMEAR MICROSCOPIC EXAM ([facility]) - Abnormal; Notable for the following components:    Platelet Est Decreased (*)     All other components within normal limits   CBC W/ AUTO DIFFERENTIAL    Narrative:     The following orders were created for panel order CBC Auto Differential.  Procedure                               Abnormality         Status                     ---------                               -----------         ------                     CBC with Differential[[id number]]        Abnormal            Final result                 Please view results for these tests on the individual orders.          Imaging Results              CT Abdomen Pelvis  Without Contrast (Final result)  Result  time 04/29/23 02:53:30      Final result by Jorge Patel MD (04/29/23 02:53:30)                   Impression:        1. The cecum, ascending colon and transverse colon contain a large amount of formed stool and there is dilatation with fluid-filled loops of small bowel including the gastric lumen which can be seen with constipation.  There is also a swirling pattern involving the mesentery which could be the result of the constipation and dilatation however I would recommend clinical correlation and follow-up to exclude the possibility of a developing volvulus.    n/a    CATEGORY:n/a    The following dose reduction techniques are used for all CT at [facility]:    1.   Automated exposure control.    2.   Adjustment of the mA and/or kV according to patient size.    3.   Use of iterative reconstruction technique.      Electronically signed by: Jorge Patel  Date:    04/29/2023  Time:    02:53               Narrative:      STUDY:CT SCAN OF THE ABDOMEN AND PELVIS WITHOUT INTRAVENOUS CONTRAST    CLINICAL HISTORY & TECHNIQUE:    Pro Mayer, RT on 4/29/2023  2:09 AM    ER PT    PROCEDURE: CT ABD\PELVIS WO    CLINICAL HX: PTA  ABD PN  N\V     RECENT SCREENING FOR COLORECTAL CA    PMH:DIABETES    BOWEL OBSTRUCTION  CHANDRAKANT    CV    TECHNIQUE:    IV CONTRAST:    ORAL CONTRAST: N/A    RECTAL CONTRAST: N/A    AXIAL IMAGING @ 5 MM INTERVALS W/MULTIPLANAR RECONSTRUCTION OF IMAGES    -TOTAL IMAGE NUMBER: [id number]    -CTDIVOL(MGY) HEAD:   BODY: 14.30    -DLP (MGYCM) HEAD:      BODY: 779.70    TECH: RW    COMPARISON:  07/27/2021    FINDINGS:    Liver:  No clinically significant abnormalities noted.    Gallbladder/Biliary System:  Compatible with a previous cholecystectomy.    Spleen:  No clinically significant abnormalities noted.    Adrenal glands:  No clinically significant abnormalities noted.    Pancreas:  No clinically significant abnormalities noted.    Kidneys/Urinary Tract:  No clinically significant  abnormalities noted.    Urinary bladder:  No clinically significant abnormalities noted.    Prostate gland/uterus and ovaries:  No clinically significant abnormalities noted.    GI tract:  The visible lower portion of the esophagus is dilated and shows relative thickening of its wall.  The gastric lumen is distended with fluid and there are multiple loops of dilated fluid-filled small bowel containing air-fluid levels while the cecum, ascending colon and transverse colon contain a large amount of formed stool which can be seen with constipation.    Vascular structures:  Mild to moderate atherosclerotic calcification is present involving the aorta and its major branches.    Musculoskeletal structures:  Bony demineralization is present with the a compression fracture of L2 which apparently has undergone vertebroplasty.    Miscellaneous:  No clinically significant abnormalities noted.                                       Medications   piperacillin-tazobactam (ZOSYN) 4.5 g in dextrose 5 % in water (D5W) 5 % 100 mL IVPB (MB+) (has no administration in time range)   sodium chloride 0.9% bolus 1,000 mL 1,000 mL (0 mLs Intravenous Stopped 4/29/23 0351)   ondansetron injection 4 mg (4 mg Intravenous Given 4/29/23 0139)   HYDROmorphone injection 1 mg (1 mg Intravenous Given 4/29/23 0138)     Medical Decision Making:   Initial Assessment:   Abdominal pain  Differential Diagnosis:   Small-bowel obstruction, constipation, ileus  Clinical Tests:   Lab Tests: Ordered and Reviewed  Radiological Study: Ordered and Reviewed  ED Management:  CT with dilated small bowel loops, etiology ileus versus developing volvulus.  Discussed with Dr. Charles, recommended NG tube, IV antibiotics, we will see in consult.                        Clinical Impression:   Final diagnoses:  [K56.609] SBO (small bowel obstruction) (Primary)        ED Disposition Condition    Admit Stable                Pro Owusu MD  04/29/23 2935

## 2023-04-29 NOTE — CONSULTS
Patient is a 62-year-old  male with diffuse abdominal pain particularly after dinner on Friday night started having nausea vomiting bloating and was grossly constipated.  He had a CT scan that suggested possibility of a large fecal bolus in his colon and large bowel obstruction secondary to this.  Patient was admitted given NG tube suction IV fluids I was consulted for evaluation and workup.  It should be noted that patient has never had an EGD or colonoscopy.  Patient has also had 2 previous back surgeries and been on pain management since he was 33 years old at least 30 years.  He is had episodes of methadone and morphine patch used along with taking presently Percocet 10s at least every 6-8 hours alternating with Norco.  Patient has also had nerve stimulators placed and removed.  Patient is also known to be bipolar on medication probably contributing to his constipation issues presently taking BuSpar, Cymbalta , Seroquel , Ranexa and trazodone.    Allergies  1. Penicillin causes swelling    Past medical history  1. Bipolar disorder  2. Chronic constipation  3. Chronic neck and back pain along the cervical vertebrae as well as along the lower lumbar with the associated chronic pain medicine over the last 30 years.  4. Diabetes A1c unknown  5. Gastroesophageal reflux disease  6. Benign prostatic hypertrophy  7. Severe neuropathy of the distal extremity on Neurontin  8. Chronic hypertension  9. Morbid obesity at 5 ft 7 in 270 lb      Past surgical history  1. Cholecystectomy laparoscopic  2. Nasal septal deviation repair  3. Left ankle open reduction internal fixation  4. Left testicular repair  5. No EGD or colonoscopy done  6. Nerve stimulator insertion then removal secondary to dysfunction  7. 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 Stress test last year in the summer  9. Echo last year   10. Angiogram last year by Dr. AROLDO javier in the summer of 2022  11. Patient has had lower back surgery L2-3  diskectomy with fusion at the age of 33 and then again at the age of 43 he had a 2nd back surgery L3-4 5 and S1 with the iliac crest bone graft obtained   12. Patient had nerve block injections of C2-3 as well as cementing of L2-3 in 2022 with a closed head injury that was monitored    Family history  1. Mother  at 77 from COPD she was a smoker  2. Father  at 84 from COPD was a smoker  3. Sister  of breast cancer  4. Brother has pancreatic cancer living    Immunizations  1. Tetanus shot within 5 years   2. No flu shot  3. No pneumonia shot  4. Had a COVID-19 shot x2   5. No shingle shot  6. No hepatitis shot    Social history  1. Smokes a pack a day for 45 years down to a quarter pack a day  2. Drank on holidays and work occasionally for about 15 years before he was disabled at 33  3. Smokes weed a blunt weed about an oz per month also was on chronic pain management with chronic psychiatric medicines including a Percocet every 4 hours BuSpar Seroquel trazodone Ranexa and Neurontin as well as Cymbalta  4. DWI in  is his only long-term issue  5. No  service   6. Was in rehab for bipolar disorder  7. Has college level of education computer science and worked in the as a driller in the [employer]  8. He is  his wife is a special  in [place] he has 2 children a son that is a  in [facility]  9. Lives in [facility]  10. Primary care provider is Dr. Denzel Hemphill  11. Psychiatrist is Dr. Arciniega  12. Pain management is with Dr. Smith   13. Cardiologist with Dr. Luque  14. Pain management doctor noman    Review of systems  Patient complains of abdominal pain diffusely but is now having bowel      Laboratory studies  White count 7 hemoglobin 13 hematocrit 36 platelet count is 86 renal panel is unremarkable glucose is 133 PSA is 0.67 A1c is pending CT scan suggests the large food bolus and stool in the colon    Physical exam   Head ears normal he is 5 ft 7 in  270 lb wears glasses blind in the left thigh   Heart is regular rate rhythm   Lungs are clear to auscultation   Abdomen is with distention nontender nondistended  Extremities have no clubbing cyanosis edema  Neurologically intact no motor or sensory asymmetry      Assessment  Patient is a 62-year-old  male with diffuse abdominal pain particularly after dinner on Friday night started having nausea vomiting bloating and was grossly constipated.  He had a CT scan that suggested possibility of a large fecal bolus in his colon and large bowel obstruction secondary to this.  Patient was admitted given NG tube suction IV fluids I was consulted for evaluation and workup.  It should be noted that patient has never had an EGD or colonoscopy.  Patient has also had 2 previous back surgeries and been on pain management since he was 33 years old at least 30 years.  He is had episodes of methadone and morphine patch used along with taking presently Percocet 10s at least every 6-8 hours alternating with Norco.  Patient has also had nerve stimulators placed and removed.  Patient is also known to be bipolar on medication probably contributing to his constipation issues presently taking BuSpar, Cymbalta , Seroquel , Ranexa and trazodone.    Plan  1. IV fluids  2. Cathartics with MiraLax, Relistor   3. Ultrasound of the abdomen    4. Possible CT abdomen pelvis with oral contrast/rectal contrast  5. Endoscopic workup as an outpatient  6. Laboratory studies that include stools for blood

## 2023-04-29 NOTE — HOSPITAL COURSE
04/29/2023 patient feeling better has had a good bowel movement.  CT showed large amount of stool in the colon.  He is thirsty and wants something to drink.  He denies any current nausea.  There has been no vomiting.  04/30/2023 patient doing better is having good bowel movements.  Dr. Charles has ordered some ultrasounds as well as further cleaned out of his bowels.  05/01/2023 brief discharge summary 62-year-old male presented with abdominal pain distention.  In the ER was found to have possible bowel obstruction versus constipation.  Hospital course he was admitted to the hospital we put an NG tube to intermittent low wall suction and consulted surgery.  Surgery felt patient was just constipated cleaned him out over a couple of days and now he is doing much better.  Tolerating diet having good bowel movements passing flatus the NG tube had been discontinued yesterday.  In further discussing with the patient he decreased his lactulose.  We will have him back on it 3 times a day and he will continue that along with his usual home medications.  Follow-up with his primary care physician in 1 week.

## 2023-04-29 NOTE — PLAN OF CARE
Problem: Adult Inpatient Plan of Care  Goal: Plan of Care Review  Outcome: Ongoing, Progressing  Flowsheets (Taken 4/29/2023 3224)  Plan of Care Reviewed With:   patient   spouse  Goal: Absence of Hospital-Acquired Illness or Injury  Outcome: Ongoing, Progressing  Goal: Optimal Comfort and Wellbeing  Outcome: Ongoing, Progressing  Goal: Readiness for Transition of Care  Outcome: Ongoing, Progressing     Problem: Bariatric Environmental Safety  Goal: Safety Maintained with Care  Outcome: Ongoing, Progressing
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
## 2023-04-29 NOTE — SUBJECTIVE & OBJECTIVE
History reviewed. No pertinent past medical history.    History reviewed. No pertinent surgical history.    Review of patient's allergies indicates:   Allergen Reactions    Penicillins        No current facility-administered medications on file prior to encounter.     Current Outpatient Medications on File Prior to Encounter   Medication Sig    tadalafiL (CIALIS) 5 MG tablet Take 5 mg by mouth as needed.    baclofen (LIORESAL) 10 MG tablet Take 10 mg by mouth 3 (three) times daily.    busPIRone (BUSPAR) 15 MG tablet Take 15 mg by mouth 3 (three) times daily.    cloNIDine (CATAPRES) 0.1 MG tablet Take 0.1 mg by mouth every evening.    DULoxetine (CYMBALTA) 60 MG capsule Take 60 mg by mouth 2 (two) times a day.    furosemide (LASIX) 40 MG tablet Take 40 mg by mouth once daily.    gabapentin (NEURONTIN) 300 MG capsule Take 300 mg by mouth 3 (three) times daily.    glimepiride (AMARYL) 2 MG tablet Take 2 mg by mouth once daily.    meclizine (ANTIVERT) 25 mg tablet Take 25 mg by mouth once daily.    meloxicam (MOBIC) 7.5 MG tablet Take 7.5 mg by mouth 2 (two) times daily as needed.    memantine (NAMENDA) 5 MG Tab Take 5 mg by mouth once daily.    omeprazole (PRILOSEC) 20 MG capsule Take 20 mg by mouth once daily.    oxyCODONE-acetaminophen (PERCOCET)  mg per tablet Take 10 tablets by mouth every 6 to 8 hours as needed.    QUEtiapine (SEROQUEL) 300 MG Tab Take 300 mg by mouth 2 (two) times a day.    ranolazine (RANEXA) 1,000 mg Tb12 Take 1,000 mg by mouth 2 (two) times a day.    tamsulosin (FLOMAX) 0.4 mg Cap Take 1 capsule by mouth once daily.    terbinafine HCL (LAMISIL) 250 mg tablet Take 250 mg by mouth once daily.    traZODone (DESYREL) 150 MG tablet Take 150 mg by mouth 2 (two) times a day.     Family History    None       Tobacco Use    Smoking status: Not on file    Smokeless tobacco: Not on file   Substance and Sexual Activity    Alcohol use: Not on file    Drug use: Not on file    Sexual activity: Not on  file     Review of Systems   Constitutional: Negative.    HENT: Negative.     Eyes: Negative.    Respiratory: Negative.     Cardiovascular: Negative.    Gastrointestinal:  Positive for abdominal pain, nausea and vomiting.   Endocrine: Negative.    Genitourinary: Negative.    Musculoskeletal: Negative.    Skin: Negative.    Neurological: Negative.    Hematological: Negative.    Psychiatric/Behavioral: Negative.     Objective:     Vital Signs (Most Recent):  Temp: 98.4 °F (36.9 °C) (04/29/23 1035)  Pulse: 102 (04/29/23 1035)  Resp: 16 (04/29/23 1038)  BP: (!) 158/87 (04/29/23 1035)  SpO2: 97 % (04/29/23 1035)   Vital Signs (24h Range):  Temp:  [97.2 °F (36.2 °C)-98.4 °F (36.9 °C)] 98.4 °F (36.9 °C)  Pulse:  [] 102  Resp:  [16-20] 16  SpO2:  [95 %-99 %] 97 %  BP: (123-158)/() 158/87     Weight: 124.3 kg (274 lb)  Body mass index is 42.91 kg/m².    Physical Exam  Constitutional:       Appearance: Normal appearance. He is obese.   HENT:      Head: Normocephalic and atraumatic.      Mouth/Throat:      Pharynx: Oropharynx is clear.   Eyes:      Extraocular Movements: Extraocular movements intact.      Conjunctiva/sclera: Conjunctivae normal.      Pupils: Pupils are equal, round, and reactive to light.   Cardiovascular:      Rate and Rhythm: Normal rate and regular rhythm.      Pulses: Normal pulses.      Heart sounds: Normal heart sounds.   Pulmonary:      Effort: Pulmonary effort is normal.      Breath sounds: Normal breath sounds.   Abdominal:      General: Bowel sounds are normal. There is distension.      Palpations: Abdomen is soft.      Tenderness: There is abdominal tenderness.   Musculoskeletal:         General: Normal range of motion.      Cervical back: Normal range of motion and neck supple.   Skin:     General: Skin is warm and dry.   Neurological:      General: No focal deficit present.      Mental Status: He is alert and oriented to person, place, and time. Mental status is at baseline.    Psychiatric:         Mood and Affect: Mood normal.         CRANIAL NERVES     CN III, IV, VI   Pupils are equal, round, and reactive to light.     Significant Labs: All pertinent labs within the past 24 hours have been reviewed.  A1C: No results for input(s): HGBA1C in the last 4320 hours.  ABGs: No results for input(s): PH, PCO2, HCO3, POCSATURATED, BE, TOTALHB, COHB, METHB, O2HB, POCFIO2, PO2 in the last 48 hours.  Bilirubin:   Recent Labs   Lab 04/24/23  0630 04/29/23 0135 04/29/23  0700   BILITOT 0.4 0.8 0.8       Blood Culture: No results for input(s): LABBLOO in the last 48 hours.  BMP:   Recent Labs   Lab 04/29/23  0700      K 4.1   CO2 30   BUN 19.0   CREATININE 0.97   CALCIUM 9.0       CBC:   Recent Labs   Lab 04/29/23 0135 04/29/23  0700   WBC 7.4 7.2   HGB 13.3 13.0   HCT 35.9* 35.4*   PLT 90* 86*       CMP:   Recent Labs   Lab 04/29/23 0135 04/29/23  0700    137   K 4.3 4.1   CO2 29 30   BUN 19.0 19.0   CREATININE 1.05 0.97   CALCIUM 9.4 9.0   ALBUMIN 4.5 4.1   BILITOT 0.8 0.8   ALKPHOS 55 50   AST 19 18   ALT 16 16       Cardiac Markers: No results for input(s): CKMB, MYOGLOBIN, BNP, TROPISTAT in the last 48 hours.  Coagulation: No results for input(s): PT, INR, APTT in the last 48 hours.  Lactic Acid: No results for input(s): LACTATE in the last 48 hours.  Lipase:   Recent Labs   Lab 04/29/23 0135   LIPASE <20*       Lipid Panel: No results for input(s): CHOL, HDL, LDLCALC, TRIG, CHOLHDL in the last 48 hours.  Magnesium: No results for input(s): MG in the last 48 hours.  Pathology Results  (Last 10 years)      None          POCT Glucose:   Recent Labs   Lab 04/29/23  0638   POCTGLUCOSE 122*     Prealbumin: No results for input(s): PREALBUMIN in the last 48 hours.  Respiratory Culture: No results for input(s): GSRESP, RESPIRATORYC in the last 48 hours.  Troponin: No results for input(s): TROPONINI, TROPONINIHS in the last 48 hours.  TSH: No results for input(s): TSH in the last 4320  hours.  Urine Studies: No results for input(s): COLORU, APPEARANCEUA, PHUR, SPECGRAV, PROTEINUA, GLUCUA, KETONESU, BILIRUBINUA, OCCULTUA, NITRITE, UROBILINOGEN, LEUKOCYTESUR, RBCUA, WBCUA, BACTERIA, SQUAMEPITHEL, HYALINECASTS in the last 48 hours.    Invalid input(s): WRIGHTSUR    Significant Imaging:    ABD CT  1. The cecum, ascending colon and transverse colon contain a large amount of formed stool and there is dilatation with fluid-filled loops of small bowel including the gastric lumen which can be seen with constipation.  There is also a swirling pattern involving the mesentery which could be the result of the constipation and dilatation however I would recommend clinical correlation and follow-up to exclude the possibility of a developing volvulus

## 2023-04-30 LAB
ANION GAP SERPL CALC-SCNC: 6 MEQ/L (ref 2–13)
BASOPHILS # BLD AUTO: 0.02 X10(3)/MCL (ref 0.01–0.08)
BASOPHILS NFR BLD AUTO: 0.4 % (ref 0.1–1.2)
BUN SERPL-MCNC: 14 MG/DL (ref 7–20)
CALCIUM SERPL-MCNC: 8.3 MG/DL (ref 8.4–10.2)
CHLORIDE SERPL-SCNC: 105 MMOL/L (ref 98–110)
CO2 SERPL-SCNC: 28 MMOL/L (ref 21–32)
COLOR STL: NORMAL
CONSISTENCY STL: NORMAL
CREAT SERPL-MCNC: 0.94 MG/DL (ref 0.66–1.25)
CREAT/UREA NIT SERPL: 15 (ref 12–20)
EOSINOPHIL # BLD AUTO: 0.05 X10(3)/MCL (ref 0.04–0.54)
EOSINOPHIL NFR BLD AUTO: 1.1 % (ref 0.7–7)
ERYTHROCYTE [DISTWIDTH] IN BLOOD BY AUTOMATED COUNT: 11.3 % (ref 11.6–14.4)
GFR SERPLBLD CREATININE-BSD FMLA CKD-EPI: >90 MLS/MIN/1.73/M2
GLUCOSE SERPL-MCNC: 131 MG/DL (ref 70–115)
HCT VFR BLD AUTO: 30.5 % (ref 36–52)
HEMOCCULT SP1 STL QL: NEGATIVE
HEMOCCULT SP2 STL QL: NEGATIVE
HEMOCCULT SP3 STL QL: NEGATIVE
HGB BLD-MCNC: 11 G/DL (ref 13–18)
IMM GRANULOCYTES # BLD AUTO: 0.02 X10(3)/MCL (ref 0–0.03)
IMM GRANULOCYTES NFR BLD AUTO: 0.4 % (ref 0–0.5)
LYMPHOCYTES # BLD AUTO: 1.66 X10(3)/MCL (ref 1.32–3.57)
LYMPHOCYTES NFR BLD AUTO: 34.9 % (ref 20–55)
MCH RBC QN AUTO: 31.8 PG (ref 27–34)
MCV RBC AUTO: 88.2 FL (ref 79–99)
MEAN CELL HEMOGLOBIN CONCENTRATION (OHS) G/DL: 36.1 G/DL (ref 31–37)
MONOCYTES # BLD AUTO: 0.66 X10(3)/MCL (ref 0.3–0.82)
MONOCYTES NFR BLD AUTO: 13.9 % (ref 4.7–12.5)
NEUTROPHILS # BLD AUTO: 2.34 X10(3)/MCL (ref 1.78–5.38)
NEUTROPHILS NFR BLD AUTO: 49.3 % (ref 37–73)
NRBC BLD AUTO-RTO: 0 % (ref 0–1)
PLATELET # BLD AUTO: 73 X10(3)/MCL (ref 140–371)
PLATELET # BLD EST: ABNORMAL 10*3/UL
PMV BLD AUTO: 9.3 FL (ref 9.4–12.4)
POCT GLUCOSE: 108 MG/DL (ref 70–110)
POCT GLUCOSE: 75 MG/DL (ref 70–110)
POCT GLUCOSE: 81 MG/DL (ref 70–110)
POCT GLUCOSE: 99 MG/DL (ref 70–110)
POTASSIUM SERPL-SCNC: 4.4 MMOL/L (ref 3.5–5.1)
RBC # BLD AUTO: 3.46 X10(6)/MCL (ref 4–6)
RBC MORPH BLD: NORMAL
SODIUM SERPL-SCNC: 139 MMOL/L (ref 135–145)
WBC # SPEC AUTO: 4.8 X10(3)/MCL (ref 4–11.5)

## 2023-04-30 PROCEDURE — 25000003 PHARM REV CODE 250: Performed by: INTERNAL MEDICINE

## 2023-04-30 PROCEDURE — 82272 OCCULT BLD FECES 1-3 TESTS: CPT | Performed by: SURGERY

## 2023-04-30 PROCEDURE — 94761 N-INVAS EAR/PLS OXIMETRY MLT: CPT

## 2023-04-30 PROCEDURE — 25000003 PHARM REV CODE 250: Performed by: FAMILY MEDICINE

## 2023-04-30 PROCEDURE — 11000001 HC ACUTE MED/SURG PRIVATE ROOM

## 2023-04-30 PROCEDURE — 36415 COLL VENOUS BLD VENIPUNCTURE: CPT | Performed by: FAMILY MEDICINE

## 2023-04-30 PROCEDURE — 63600175 PHARM REV CODE 636 W HCPCS: Performed by: SURGERY

## 2023-04-30 PROCEDURE — 85025 COMPLETE CBC W/AUTO DIFF WBC: CPT | Performed by: FAMILY MEDICINE

## 2023-04-30 PROCEDURE — 25000003 PHARM REV CODE 250: Performed by: SURGERY

## 2023-04-30 PROCEDURE — 80048 BASIC METABOLIC PNL TOTAL CA: CPT | Performed by: FAMILY MEDICINE

## 2023-04-30 PROCEDURE — 94760 N-INVAS EAR/PLS OXIMETRY 1: CPT

## 2023-04-30 RX ADMIN — LACTULOSE 15 G: 20 SOLUTION ORAL at 02:04

## 2023-04-30 RX ADMIN — OXYCODONE AND ACETAMINOPHEN 1 TABLET: 10; 325 TABLET ORAL at 05:04

## 2023-04-30 RX ADMIN — GLIMEPIRIDE 2 MG: 2 TABLET ORAL at 08:04

## 2023-04-30 RX ADMIN — DULOXETINE 60 MG: 30 CAPSULE, DELAYED RELEASE ORAL at 08:04

## 2023-04-30 RX ADMIN — SODIUM CHLORIDE: 9 INJECTION, SOLUTION INTRAVENOUS at 04:04

## 2023-04-30 RX ADMIN — TAMSULOSIN HYDROCHLORIDE 0.4 MG: 0.4 CAPSULE ORAL at 08:04

## 2023-04-30 RX ADMIN — TRAZODONE HYDROCHLORIDE 150 MG: 50 TABLET ORAL at 08:04

## 2023-04-30 RX ADMIN — BACLOFEN 10 MG: 10 TABLET ORAL at 08:04

## 2023-04-30 RX ADMIN — OXYCODONE AND ACETAMINOPHEN 1 TABLET: 10; 325 TABLET ORAL at 08:04

## 2023-04-30 RX ADMIN — METHYLNALTREXONE BROMIDE 12 MG: 12 INJECTION, SOLUTION SUBCUTANEOUS at 08:04

## 2023-04-30 RX ADMIN — POLYETHYLENE GLYCOL 3350 17 G: 17 POWDER, FOR SOLUTION ORAL at 01:04

## 2023-04-30 RX ADMIN — GABAPENTIN 300 MG: 300 CAPSULE ORAL at 08:04

## 2023-04-30 RX ADMIN — MEMANTINE 5 MG: 5 TABLET ORAL at 08:04

## 2023-04-30 RX ADMIN — BUSPIRONE HYDROCHLORIDE 15 MG: 15 TABLET ORAL at 08:04

## 2023-04-30 RX ADMIN — QUETIAPINE FUMARATE 300 MG: 100 TABLET ORAL at 08:04

## 2023-04-30 RX ADMIN — RANOLAZINE 1000 MG: 500 TABLET, FILM COATED, EXTENDED RELEASE ORAL at 08:04

## 2023-04-30 RX ADMIN — LACTULOSE 15 G: 20 SOLUTION ORAL at 08:04

## 2023-04-30 RX ADMIN — GABAPENTIN 300 MG: 300 CAPSULE ORAL at 02:04

## 2023-04-30 RX ADMIN — CLONIDINE HYDROCHLORIDE 0.1 MG: 0.1 TABLET ORAL at 08:04

## 2023-04-30 RX ADMIN — BACLOFEN 10 MG: 10 TABLET ORAL at 02:04

## 2023-04-30 RX ADMIN — FUROSEMIDE 40 MG: 40 TABLET ORAL at 08:04

## 2023-04-30 RX ADMIN — SODIUM CHLORIDE: 9 INJECTION, SOLUTION INTRAVENOUS at 02:04

## 2023-04-30 RX ADMIN — MECLIZINE 25 MG: 12.5 TABLET ORAL at 08:04

## 2023-04-30 RX ADMIN — BUSPIRONE HYDROCHLORIDE 15 MG: 15 TABLET ORAL at 02:04

## 2023-04-30 RX ADMIN — PANTOPRAZOLE SODIUM 40 MG: 40 TABLET, DELAYED RELEASE ORAL at 08:04

## 2023-04-30 NOTE — SUBJECTIVE & OBJECTIVE
History reviewed. No pertinent past medical history.    History reviewed. No pertinent surgical history.    Review of patient's allergies indicates:   Allergen Reactions    Penicillins        No current facility-administered medications on file prior to encounter.     Current Outpatient Medications on File Prior to Encounter   Medication Sig    tadalafiL (CIALIS) 5 MG tablet Take 5 mg by mouth as needed.    baclofen (LIORESAL) 10 MG tablet Take 10 mg by mouth 3 (three) times daily.    busPIRone (BUSPAR) 15 MG tablet Take 15 mg by mouth 3 (three) times daily.    cloNIDine (CATAPRES) 0.1 MG tablet Take 0.1 mg by mouth every evening.    DULoxetine (CYMBALTA) 60 MG capsule Take 60 mg by mouth 2 (two) times a day.    furosemide (LASIX) 40 MG tablet Take 40 mg by mouth once daily.    gabapentin (NEURONTIN) 300 MG capsule Take 300 mg by mouth 3 (three) times daily.    glimepiride (AMARYL) 2 MG tablet Take 2 mg by mouth once daily.    meclizine (ANTIVERT) 25 mg tablet Take 25 mg by mouth once daily.    meloxicam (MOBIC) 7.5 MG tablet Take 7.5 mg by mouth 2 (two) times daily as needed.    memantine (NAMENDA) 5 MG Tab Take 5 mg by mouth once daily.    omeprazole (PRILOSEC) 20 MG capsule Take 20 mg by mouth once daily.    oxyCODONE-acetaminophen (PERCOCET)  mg per tablet Take 10 tablets by mouth every 6 to 8 hours as needed.    QUEtiapine (SEROQUEL) 300 MG Tab Take 300 mg by mouth 2 (two) times a day.    ranolazine (RANEXA) 1,000 mg Tb12 Take 1,000 mg by mouth 2 (two) times a day.    tamsulosin (FLOMAX) 0.4 mg Cap Take 1 capsule by mouth once daily.    terbinafine HCL (LAMISIL) 250 mg tablet Take 250 mg by mouth once daily.    traZODone (DESYREL) 150 MG tablet Take 150 mg by mouth 2 (two) times a day.     Family History    None       Tobacco Use    Smoking status: Not on file    Smokeless tobacco: Not on file   Substance and Sexual Activity    Alcohol use: Not on file    Drug use: Not on file    Sexual activity: Not on  file     Review of Systems   Constitutional: Negative.    HENT: Negative.     Eyes: Negative.    Respiratory: Negative.     Cardiovascular: Negative.    Gastrointestinal:  Positive for abdominal pain, nausea and vomiting.   Endocrine: Negative.    Genitourinary: Negative.    Musculoskeletal: Negative.    Skin: Negative.    Neurological: Negative.    Hematological: Negative.    Psychiatric/Behavioral: Negative.     Objective:     Vital Signs (Most Recent):  Temp: 98.1 °F (36.7 °C) (04/30/23 0725)  Pulse: 64 (04/30/23 0905)  Resp: 20 (04/30/23 0905)  BP: 121/70 (04/30/23 0725)  SpO2: (!) 94 % (04/30/23 0905)   Vital Signs (24h Range):  Temp:  [97.8 °F (36.6 °C)-98.4 °F (36.9 °C)] 98.1 °F (36.7 °C)  Pulse:  [] 64  Resp:  [16-20] 20  SpO2:  [94 %-98 %] 94 %  BP: (109-158)/(70-87) 121/70     Weight: 124.3 kg (274 lb)  Body mass index is 42.91 kg/m².    Physical Exam  Constitutional:       Appearance: Normal appearance. He is obese.   HENT:      Head: Normocephalic and atraumatic.      Mouth/Throat:      Pharynx: Oropharynx is clear.   Eyes:      Extraocular Movements: Extraocular movements intact.      Conjunctiva/sclera: Conjunctivae normal.      Pupils: Pupils are equal, round, and reactive to light.   Cardiovascular:      Rate and Rhythm: Normal rate and regular rhythm.      Pulses: Normal pulses.      Heart sounds: Normal heart sounds.   Pulmonary:      Effort: Pulmonary effort is normal.      Breath sounds: Normal breath sounds.   Abdominal:      General: Bowel sounds are normal. There is distension.      Palpations: Abdomen is soft.      Tenderness: There is abdominal tenderness.   Musculoskeletal:         General: Normal range of motion.      Cervical back: Normal range of motion and neck supple.   Skin:     General: Skin is warm and dry.   Neurological:      General: No focal deficit present.      Mental Status: He is alert and oriented to person, place, and time. Mental status is at baseline.    Psychiatric:         Mood and Affect: Mood normal.         CRANIAL NERVES     CN III, IV, VI   Pupils are equal, round, and reactive to light.     Significant Labs: All pertinent labs within the past 24 hours have been reviewed.  A1C: No results for input(s): HGBA1C in the last 4320 hours.  ABGs: No results for input(s): PH, PCO2, HCO3, POCSATURATED, BE, TOTALHB, COHB, METHB, O2HB, POCFIO2, PO2 in the last 48 hours.  Bilirubin:   Recent Labs   Lab 04/24/23  0630 04/29/23  0135 04/29/23  0700   BILITOT 0.4 0.8 0.8       Blood Culture: No results for input(s): LABBLOO in the last 48 hours.  BMP:   Recent Labs   Lab 04/30/23  0855      K 4.4   CO2 28   BUN 14.0   CREATININE 0.94   CALCIUM 8.3*       CBC:   Recent Labs   Lab 04/29/23  0135 04/29/23  0700 04/30/23  0855   WBC 7.4 7.2 4.8   HGB 13.3 13.0 11.0*   HCT 35.9* 35.4* 30.5*   PLT 90* 86* 73*       CMP:   Recent Labs   Lab 04/29/23  0135 04/29/23  0700 04/30/23  0855    137 139   K 4.3 4.1 4.4   CO2 29 30 28   BUN 19.0 19.0 14.0   CREATININE 1.05 0.97 0.94   CALCIUM 9.4 9.0 8.3*   ALBUMIN 4.5 4.1  --    BILITOT 0.8 0.8  --    ALKPHOS 55 50  --    AST 19 18  --    ALT 16 16  --        Cardiac Markers: No results for input(s): CKMB, MYOGLOBIN, BNP, TROPISTAT in the last 48 hours.  Coagulation: No results for input(s): PT, INR, APTT in the last 48 hours.  Lactic Acid: No results for input(s): LACTATE in the last 48 hours.  Lipase:   Recent Labs   Lab 04/29/23  0135   LIPASE <20*       Lipid Panel: No results for input(s): CHOL, HDL, LDLCALC, TRIG, CHOLHDL in the last 48 hours.  Magnesium: No results for input(s): MG in the last 48 hours.  Pathology Results  (Last 10 years)      None          POCT Glucose:   Recent Labs   Lab 04/29/23  1153 04/29/23  2031 04/30/23  0704   POCTGLUCOSE 119* 145* 108       Prealbumin: No results for input(s): PREALBUMIN in the last 48 hours.  Respiratory Culture: No results for input(s): GSRESP, RESPIRATORYC in the last 48  hours.  Troponin: No results for input(s): TROPONINI, TROPONINIHS in the last 48 hours.  TSH: No results for input(s): TSH in the last 4320 hours.  Urine Studies: No results for input(s): COLORU, APPEARANCEUA, PHUR, SPECGRAV, PROTEINUA, GLUCUA, KETONESU, BILIRUBINUA, OCCULTUA, NITRITE, UROBILINOGEN, LEUKOCYTESUR, RBCUA, WBCUA, BACTERIA, SQUAMEPITHEL, HYALINECASTS in the last 48 hours.    Invalid input(s): WRIGHTSUR    Significant Imaging:    ABD CT  1. The cecum, ascending colon and transverse colon contain a large amount of formed stool and there is dilatation with fluid-filled loops of small bowel including the gastric lumen which can be seen with constipation.  There is also a swirling pattern involving the mesentery which could be the result of the constipation and dilatation however I would recommend clinical correlation and follow-up to exclude the possibility of a developing volvulus
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
## 2023-05-01 VITALS
RESPIRATION RATE: 20 BRPM | WEIGHT: 273.63 LBS | SYSTOLIC BLOOD PRESSURE: 122 MMHG | HEIGHT: 67 IN | DIASTOLIC BLOOD PRESSURE: 91 MMHG | BODY MASS INDEX: 42.95 KG/M2 | HEART RATE: 68 BPM | OXYGEN SATURATION: 98 % | TEMPERATURE: 97 F

## 2023-05-01 LAB
ABS NEUT CALC (OHS): 1.76 X10(3)/MCL (ref 2.1–9.2)
ANION GAP SERPL CALC-SCNC: 4 MEQ/L (ref 2–13)
ANISOCYTOSIS BLD QL SMEAR: SLIGHT
BUN SERPL-MCNC: 11 MG/DL (ref 7–20)
CALCIUM SERPL-MCNC: 8.1 MG/DL (ref 8.4–10.2)
CHLORIDE SERPL-SCNC: 106 MMOL/L (ref 98–110)
CO2 SERPL-SCNC: 28 MMOL/L (ref 21–32)
CREAT SERPL-MCNC: 0.81 MG/DL (ref 0.66–1.25)
CREAT/UREA NIT SERPL: 14 (ref 12–20)
EOSINOPHIL NFR BLD MANUAL: 0.07 X10(3)/MCL (ref 0–0.9)
EOSINOPHIL NFR BLD MANUAL: 2 % (ref 0–8)
ERYTHROCYTE [DISTWIDTH] IN BLOOD BY AUTOMATED COUNT: 11.1 % (ref 11.6–14.4)
GFR SERPLBLD CREATININE-BSD FMLA CKD-EPI: >90 MLS/MIN/1.73/M2
GLUCOSE SERPL-MCNC: 89 MG/DL (ref 70–110)
GLUCOSE SERPL-MCNC: 89 MG/DL (ref 70–115)
HCT VFR BLD AUTO: 27.8 % (ref 36–52)
HGB BLD-MCNC: 9.8 G/DL (ref 13–18)
IMM GRANULOCYTES # BLD AUTO: 0.03 X10(3)/MCL (ref 0–0.03)
IMM GRANULOCYTES NFR BLD AUTO: 0.8 % (ref 0–0.5)
LYMPHOCYTES NFR BLD MANUAL: 1.58 X10(3)/MCL
LYMPHOCYTES NFR BLD MANUAL: 44 % (ref 13–40)
MCH RBC QN AUTO: 31.1 PG (ref 27–34)
MCV RBC AUTO: 88.3 FL (ref 79–99)
MEAN CELL HEMOGLOBIN CONCENTRATION (OHS) G/DL: 35.3 G/DL (ref 31–37)
MONOCYTES NFR BLD MANUAL: 0.18 X10(3)/MCL (ref 0.1–1.3)
MONOCYTES NFR BLD MANUAL: 5 % (ref 2–11)
NEUTROPHILS NFR BLD MANUAL: 46 % (ref 47–80)
NEUTS BAND NFR BLD MANUAL: 3 % (ref 0–11)
NRBC BLD AUTO-RTO: 0 % (ref 0–1)
PLATELET # BLD AUTO: 67 X10(3)/MCL (ref 140–371)
PLATELET # BLD EST: ABNORMAL 10*3/UL
PMV BLD AUTO: 9.4 FL (ref 9.4–12.4)
POCT GLUCOSE: 93 MG/DL (ref 70–110)
POTASSIUM SERPL-SCNC: 3.5 MMOL/L (ref 3.5–5.1)
RBC # BLD AUTO: 3.15 X10(6)/MCL (ref 4–6)
SODIUM SERPL-SCNC: 138 MMOL/L (ref 135–145)
WBC # SPEC AUTO: 3.6 X10(3)/MCL (ref 4–11.5)

## 2023-05-01 PROCEDURE — 94761 N-INVAS EAR/PLS OXIMETRY MLT: CPT

## 2023-05-01 PROCEDURE — 25000003 PHARM REV CODE 250: Performed by: FAMILY MEDICINE

## 2023-05-01 PROCEDURE — 85027 COMPLETE CBC AUTOMATED: CPT | Performed by: INTERNAL MEDICINE

## 2023-05-01 PROCEDURE — 36415 COLL VENOUS BLD VENIPUNCTURE: CPT | Performed by: INTERNAL MEDICINE

## 2023-05-01 PROCEDURE — 80048 BASIC METABOLIC PNL TOTAL CA: CPT | Performed by: INTERNAL MEDICINE

## 2023-05-01 PROCEDURE — 25000003 PHARM REV CODE 250: Performed by: INTERNAL MEDICINE

## 2023-05-01 RX ORDER — LACTULOSE 10 G/15ML
15 SOLUTION ORAL 3 TIMES DAILY
Qty: 10000 ML | Refills: 6 | Status: SHIPPED | OUTPATIENT
Start: 2023-05-01

## 2023-05-01 RX ADMIN — SODIUM CHLORIDE: 9 INJECTION, SOLUTION INTRAVENOUS at 02:05

## 2023-05-01 RX ADMIN — RANOLAZINE 1000 MG: 500 TABLET, FILM COATED, EXTENDED RELEASE ORAL at 09:05

## 2023-05-01 RX ADMIN — BUSPIRONE HYDROCHLORIDE 15 MG: 15 TABLET ORAL at 09:05

## 2023-05-01 RX ADMIN — TAMSULOSIN HYDROCHLORIDE 0.4 MG: 0.4 CAPSULE ORAL at 09:05

## 2023-05-01 RX ADMIN — LACTULOSE 15 G: 20 SOLUTION ORAL at 09:05

## 2023-05-01 RX ADMIN — DULOXETINE 60 MG: 30 CAPSULE, DELAYED RELEASE ORAL at 09:05

## 2023-05-01 RX ADMIN — FUROSEMIDE 40 MG: 40 TABLET ORAL at 09:05

## 2023-05-01 RX ADMIN — OXYCODONE AND ACETAMINOPHEN 1 TABLET: 10; 325 TABLET ORAL at 02:05

## 2023-05-01 RX ADMIN — GABAPENTIN 300 MG: 300 CAPSULE ORAL at 09:05

## 2023-05-01 RX ADMIN — PANTOPRAZOLE SODIUM 40 MG: 40 TABLET, DELAYED RELEASE ORAL at 09:05

## 2023-05-01 RX ADMIN — GLIMEPIRIDE 2 MG: 2 TABLET ORAL at 09:05

## 2023-05-01 RX ADMIN — MECLIZINE 25 MG: 12.5 TABLET ORAL at 09:05

## 2023-05-01 RX ADMIN — BACLOFEN 10 MG: 10 TABLET ORAL at 09:05

## 2023-05-01 RX ADMIN — MEMANTINE 5 MG: 5 TABLET ORAL at 09:05

## 2023-05-01 RX ADMIN — QUETIAPINE FUMARATE 300 MG: 100 TABLET ORAL at 09:05
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
## 2023-05-01 NOTE — NURSING
DISCHARGE INSTRUCTIONS GIVEN TO PATIENT, ALLOWED TIME FOR QUESTIONS AND CONCERNS. PATIENT AND SPOUSE VERBALIZED UNDERSTANDING OF INSTRUCTIONS GIVEN.

## 2023-05-05 ENCOUNTER — HOSPITAL ENCOUNTER (INPATIENT)
Facility: HOSPITAL | Age: 63
LOS: 3 days | Discharge: HOME | DRG: 392 | End: 2023-05-08
Attending: FAMILY MEDICINE | Admitting: INTERNAL MEDICINE
Payer: MEDICARE

## 2023-05-05 DIAGNOSIS — R11.2 NAUSEA AND VOMITING, UNSPECIFIED VOMITING TYPE: ICD-10-CM

## 2023-05-05 DIAGNOSIS — R10.84 GENERALIZED ABDOMINAL PAIN: ICD-10-CM

## 2023-05-05 DIAGNOSIS — K29.70 GASTRITIS, PRESENCE OF BLEEDING UNSPECIFIED, UNSPECIFIED CHRONICITY, UNSPECIFIED GASTRITIS TYPE: Primary | ICD-10-CM

## 2023-05-05 LAB
ALBUMIN SERPL-MCNC: 3.7 G/DL (ref 3.4–5)
ALBUMIN/GLOB SERPL: 1.4 RATIO
ALP SERPL-CCNC: 53 UNIT/L (ref 50–144)
ALT SERPL-CCNC: 16 UNIT/L (ref 1–45)
ANION GAP SERPL CALC-SCNC: 7 MEQ/L (ref 2–13)
AST SERPL-CCNC: 19 UNIT/L (ref 17–59)
BASOPHILS # BLD AUTO: 0.03 X10(3)/MCL (ref 0.01–0.08)
BASOPHILS NFR BLD AUTO: 0.5 % (ref 0.1–1.2)
BILIRUBIN DIRECT+TOT PNL SERPL-MCNC: 0.6 MG/DL (ref 0–1)
BUN SERPL-MCNC: 6 MG/DL (ref 7–20)
CALCIUM SERPL-MCNC: 8.2 MG/DL (ref 8.4–10.2)
CHLORIDE SERPL-SCNC: 100 MMOL/L (ref 98–110)
CO2 SERPL-SCNC: 31 MMOL/L (ref 21–32)
CREAT SERPL-MCNC: 0.81 MG/DL (ref 0.66–1.25)
CREAT/UREA NIT SERPL: 7 (ref 12–20)
EOSINOPHIL # BLD AUTO: 0.03 X10(3)/MCL (ref 0.04–0.54)
EOSINOPHIL NFR BLD AUTO: 0.5 % (ref 0.7–7)
ERYTHROCYTE [DISTWIDTH] IN BLOOD BY AUTOMATED COUNT: 11.6 %
GFR SERPLBLD CREATININE-BSD FMLA CKD-EPI: >90 MLS/MIN/1.73/M2
GLOBULIN SER-MCNC: 2.7 GM/DL (ref 2–3.9)
GLUCOSE SERPL-MCNC: 127 MG/DL (ref 70–115)
HCT VFR BLD AUTO: 25.1 % (ref 36–52)
HGB BLD-MCNC: 9.1 G/DL (ref 13–18)
IMM GRANULOCYTES # BLD AUTO: 0.23 X10(3)/MCL (ref 0–0.03)
IMM GRANULOCYTES NFR BLD AUTO: 3.7 % (ref 0–0.5)
LACTATE SERPL-SCNC: 1.6 MMOL/L (ref 0.4–2)
LYMPHOCYTES # BLD AUTO: 1.01 X10(3)/MCL (ref 1.32–3.57)
LYMPHOCYTES NFR BLD AUTO: 16.2 % (ref 20–55)
MCH RBC QN AUTO: 31.8 PG (ref 27–34)
MCHC RBC AUTO-ENTMCNC: 36.3 G/DL (ref 31–37)
MCV RBC AUTO: 87.8 FL (ref 79–99)
MONOCYTES # BLD AUTO: 0.96 X10(3)/MCL (ref 0.3–0.82)
MONOCYTES NFR BLD AUTO: 15.4 % (ref 4.7–12.5)
NEUTROPHILS # BLD AUTO: 3.96 X10(3)/MCL (ref 1.78–5.38)
NEUTROPHILS NFR BLD AUTO: 63.7 % (ref 37–73)
NRBC BLD AUTO-RTO: 1.1 % (ref 0–1)
PLATELET # BLD AUTO: 129 X10(3)/MCL (ref 140–371)
PMV BLD AUTO: 8.9 FL (ref 9.4–12.4)
POTASSIUM SERPL-SCNC: 3.6 MMOL/L (ref 3.5–5.1)
PROT SERPL-MCNC: 6.4 GM/DL (ref 6.3–8.2)
RBC # BLD AUTO: 2.86 X10(6)/MCL (ref 4–6)
SODIUM SERPL-SCNC: 138 MMOL/L (ref 135–145)
WBC # SPEC AUTO: 6.22 X10(3)/MCL (ref 4–11.5)

## 2023-05-05 PROCEDURE — 80053 COMPREHEN METABOLIC PANEL: CPT | Performed by: FAMILY MEDICINE

## 2023-05-05 PROCEDURE — 25000003 PHARM REV CODE 250: Performed by: FAMILY MEDICINE

## 2023-05-05 PROCEDURE — 96375 TX/PRO/DX INJ NEW DRUG ADDON: CPT

## 2023-05-05 PROCEDURE — 25000003 PHARM REV CODE 250: Performed by: INTERNAL MEDICINE

## 2023-05-05 PROCEDURE — C9113 INJ PANTOPRAZOLE SODIUM, VIA: HCPCS | Performed by: FAMILY MEDICINE

## 2023-05-05 PROCEDURE — 96374 THER/PROPH/DIAG INJ IV PUSH: CPT

## 2023-05-05 PROCEDURE — 83605 ASSAY OF LACTIC ACID: CPT | Performed by: FAMILY MEDICINE

## 2023-05-05 PROCEDURE — 63600175 PHARM REV CODE 636 W HCPCS: Performed by: FAMILY MEDICINE

## 2023-05-05 PROCEDURE — 36415 COLL VENOUS BLD VENIPUNCTURE: CPT | Performed by: FAMILY MEDICINE

## 2023-05-05 PROCEDURE — 87040 BLOOD CULTURE FOR BACTERIA: CPT | Performed by: FAMILY MEDICINE

## 2023-05-05 PROCEDURE — 11000001 HC ACUTE MED/SURG PRIVATE ROOM

## 2023-05-05 PROCEDURE — 94761 N-INVAS EAR/PLS OXIMETRY MLT: CPT

## 2023-05-05 PROCEDURE — 99285 EMERGENCY DEPT VISIT HI MDM: CPT | Mod: 25

## 2023-05-05 PROCEDURE — 99900035 HC TECH TIME PER 15 MIN (STAT)

## 2023-05-05 PROCEDURE — 96361 HYDRATE IV INFUSION ADD-ON: CPT

## 2023-05-05 PROCEDURE — 85025 COMPLETE CBC W/AUTO DIFF WBC: CPT | Performed by: FAMILY MEDICINE

## 2023-05-05 RX ORDER — DULOXETIN HYDROCHLORIDE 30 MG/1
60 CAPSULE, DELAYED RELEASE ORAL 2 TIMES DAILY
Status: DISCONTINUED | OUTPATIENT
Start: 2023-05-05 | End: 2023-05-08 | Stop reason: HOSPADM

## 2023-05-05 RX ORDER — ONDANSETRON 2 MG/ML
4 INJECTION INTRAMUSCULAR; INTRAVENOUS EVERY 6 HOURS PRN
Status: DISCONTINUED | OUTPATIENT
Start: 2023-05-05 | End: 2023-05-08 | Stop reason: HOSPADM

## 2023-05-05 RX ORDER — DEXTROSE 40 %
30 GEL (GRAM) ORAL
Status: DISCONTINUED | OUTPATIENT
Start: 2023-05-05 | End: 2023-05-08 | Stop reason: HOSPADM

## 2023-05-05 RX ORDER — GLUCAGON 1 MG
1 KIT INJECTION
Status: DISCONTINUED | OUTPATIENT
Start: 2023-05-05 | End: 2023-05-08 | Stop reason: HOSPADM

## 2023-05-05 RX ORDER — TAMSULOSIN HYDROCHLORIDE 0.4 MG/1
1 CAPSULE ORAL DAILY
Status: DISCONTINUED | OUTPATIENT
Start: 2023-05-06 | End: 2023-05-08 | Stop reason: HOSPADM

## 2023-05-05 RX ORDER — LACTULOSE 10 G/15ML
30 SOLUTION ORAL 3 TIMES DAILY
Status: DISCONTINUED | OUTPATIENT
Start: 2023-05-06 | End: 2023-05-05

## 2023-05-05 RX ORDER — HYDROCODONE BITARTRATE AND ACETAMINOPHEN 10; 325 MG/1; MG/1
1 TABLET ORAL EVERY 6 HOURS PRN
COMMUNITY
Start: 2023-05-04

## 2023-05-05 RX ORDER — ACETAMINOPHEN 325 MG/1
650 TABLET ORAL EVERY 8 HOURS PRN
Status: DISCONTINUED | OUTPATIENT
Start: 2023-05-05 | End: 2023-05-08 | Stop reason: HOSPADM

## 2023-05-05 RX ORDER — HYDROCODONE BITARTRATE AND ACETAMINOPHEN 10; 325 MG/1; MG/1
1 TABLET ORAL EVERY 6 HOURS PRN
Status: DISCONTINUED | OUTPATIENT
Start: 2023-05-05 | End: 2023-05-05

## 2023-05-05 RX ORDER — SODIUM CHLORIDE 9 MG/ML
INJECTION, SOLUTION INTRAVENOUS CONTINUOUS
Status: DISCONTINUED | OUTPATIENT
Start: 2023-05-05 | End: 2023-05-08 | Stop reason: HOSPADM

## 2023-05-05 RX ORDER — MEMANTINE HYDROCHLORIDE 5 MG/1
5 TABLET ORAL DAILY
Status: DISCONTINUED | OUTPATIENT
Start: 2023-05-06 | End: 2023-05-08 | Stop reason: HOSPADM

## 2023-05-05 RX ORDER — OXYCODONE AND ACETAMINOPHEN 10; 325 MG/1; MG/1
1 TABLET ORAL EVERY 6 HOURS PRN
Status: DISCONTINUED | OUTPATIENT
Start: 2023-05-05 | End: 2023-05-08 | Stop reason: HOSPADM

## 2023-05-05 RX ORDER — FAMOTIDINE 10 MG/ML
20 INJECTION INTRAVENOUS 2 TIMES DAILY
Status: DISCONTINUED | OUTPATIENT
Start: 2023-05-05 | End: 2023-05-08 | Stop reason: HOSPADM

## 2023-05-05 RX ORDER — BUSPIRONE HYDROCHLORIDE 15 MG/1
15 TABLET ORAL 3 TIMES DAILY
Status: DISCONTINUED | OUTPATIENT
Start: 2023-05-06 | End: 2023-05-08 | Stop reason: HOSPADM

## 2023-05-05 RX ORDER — GABAPENTIN 300 MG/1
300 CAPSULE ORAL 3 TIMES DAILY
Status: DISCONTINUED | OUTPATIENT
Start: 2023-05-06 | End: 2023-05-08 | Stop reason: HOSPADM

## 2023-05-05 RX ORDER — RANOLAZINE 500 MG/1
1000 TABLET, EXTENDED RELEASE ORAL 2 TIMES DAILY
Status: DISCONTINUED | OUTPATIENT
Start: 2023-05-05 | End: 2023-05-08 | Stop reason: HOSPADM

## 2023-05-05 RX ORDER — QUETIAPINE FUMARATE 100 MG/1
300 TABLET, FILM COATED ORAL 2 TIMES DAILY
Status: DISCONTINUED | OUTPATIENT
Start: 2023-05-05 | End: 2023-05-06

## 2023-05-05 RX ORDER — FAMOTIDINE 10 MG/ML
20 INJECTION INTRAVENOUS
Status: COMPLETED | OUTPATIENT
Start: 2023-05-05 | End: 2023-05-05

## 2023-05-05 RX ORDER — BACLOFEN 10 MG/1
10 TABLET ORAL 3 TIMES DAILY
Status: DISCONTINUED | OUTPATIENT
Start: 2023-05-06 | End: 2023-05-08 | Stop reason: HOSPADM

## 2023-05-05 RX ORDER — LACTULOSE 10 G/15ML
23 SOLUTION ORAL; RECTAL 3 TIMES DAILY
COMMUNITY
Start: 2023-05-01

## 2023-05-05 RX ORDER — PANTOPRAZOLE SODIUM 40 MG/10ML
40 INJECTION, POWDER, LYOPHILIZED, FOR SOLUTION INTRAVENOUS
Status: COMPLETED | OUTPATIENT
Start: 2023-05-05 | End: 2023-05-05

## 2023-05-05 RX ORDER — PROCHLORPERAZINE EDISYLATE 5 MG/ML
10 INJECTION INTRAMUSCULAR; INTRAVENOUS EVERY 6 HOURS PRN
Status: DISCONTINUED | OUTPATIENT
Start: 2023-05-05 | End: 2023-05-08 | Stop reason: HOSPADM

## 2023-05-05 RX ORDER — DEXTROSE 40 %
15 GEL (GRAM) ORAL
Status: DISCONTINUED | OUTPATIENT
Start: 2023-05-05 | End: 2023-05-08 | Stop reason: HOSPADM

## 2023-05-05 RX ORDER — TRAZODONE HYDROCHLORIDE 50 MG/1
150 TABLET ORAL 2 TIMES DAILY
Status: DISCONTINUED | OUTPATIENT
Start: 2023-05-05 | End: 2023-05-06

## 2023-05-05 RX ORDER — PANTOPRAZOLE SODIUM 40 MG/10ML
40 INJECTION, POWDER, LYOPHILIZED, FOR SOLUTION INTRAVENOUS DAILY
Status: DISCONTINUED | OUTPATIENT
Start: 2023-05-06 | End: 2023-05-08 | Stop reason: HOSPADM

## 2023-05-05 RX ORDER — LACTULOSE 10 G/15ML
30 SOLUTION ORAL EVERY 6 HOURS PRN
Status: DISCONTINUED | OUTPATIENT
Start: 2023-05-05 | End: 2023-05-08 | Stop reason: HOSPADM

## 2023-05-05 RX ADMIN — QUETIAPINE FUMARATE 300 MG: 100 TABLET ORAL at 10:05

## 2023-05-05 RX ADMIN — SODIUM CHLORIDE: 9 INJECTION, SOLUTION INTRAVENOUS at 10:05

## 2023-05-05 RX ADMIN — TRAZODONE HYDROCHLORIDE 150 MG: 50 TABLET ORAL at 10:05

## 2023-05-05 RX ADMIN — RANOLAZINE 1000 MG: 500 TABLET, FILM COATED, EXTENDED RELEASE ORAL at 10:05

## 2023-05-05 RX ADMIN — DULOXETINE 60 MG: 30 CAPSULE, DELAYED RELEASE ORAL at 10:05

## 2023-05-05 RX ADMIN — OXYCODONE AND ACETAMINOPHEN 1 TABLET: 10; 325 TABLET ORAL at 10:05

## 2023-05-05 RX ADMIN — LACTULOSE 30 G: 20 SOLUTION ORAL at 10:05

## 2023-05-05 RX ADMIN — PANTOPRAZOLE SODIUM 40 MG: 40 INJECTION, POWDER, FOR SOLUTION INTRAVENOUS at 08:05

## 2023-05-05 RX ADMIN — FAMOTIDINE 20 MG: 10 INJECTION, SOLUTION INTRAVENOUS at 08:05

## 2023-05-05 RX ADMIN — SODIUM CHLORIDE 1000 ML: 9 INJECTION, SOLUTION INTRAVENOUS at 07:05

## 2023-05-06 LAB
BASOPHILS # BLD AUTO: 0.03 X10(3)/MCL (ref 0.01–0.08)
BASOPHILS NFR BLD AUTO: 0.7 % (ref 0.1–1.2)
EOSINOPHIL # BLD AUTO: 0.04 X10(3)/MCL (ref 0.04–0.54)
EOSINOPHIL NFR BLD AUTO: 0.9 % (ref 0.7–7)
ERYTHROCYTE [DISTWIDTH] IN BLOOD BY AUTOMATED COUNT: 11.7 %
HCT VFR BLD AUTO: 26.1 % (ref 36–52)
HCT VFR BLD AUTO: 26.3 % (ref 36–52)
HGB BLD-MCNC: 8.9 G/DL (ref 13–18)
HGB BLD-MCNC: 9.5 G/DL (ref 13–18)
IMM GRANULOCYTES # BLD AUTO: 0.2 X10(3)/MCL (ref 0–0.03)
IMM GRANULOCYTES NFR BLD AUTO: 4.6 % (ref 0–0.5)
LYMPHOCYTES # BLD AUTO: 0.84 X10(3)/MCL (ref 1.32–3.57)
LYMPHOCYTES NFR BLD AUTO: 19.5 % (ref 20–55)
MCH RBC QN AUTO: 30.5 PG (ref 27–34)
MCHC RBC AUTO-ENTMCNC: 34.1 G/DL (ref 31–37)
MCV RBC AUTO: 89.4 FL (ref 79–99)
MONOCYTES # BLD AUTO: 0.68 X10(3)/MCL (ref 0.3–0.82)
MONOCYTES NFR BLD AUTO: 15.8 % (ref 4.7–12.5)
NEUTROPHILS # BLD AUTO: 2.52 X10(3)/MCL (ref 1.78–5.38)
NEUTROPHILS NFR BLD AUTO: 58.5 % (ref 37–73)
NRBC BLD AUTO-RTO: 1.2 % (ref 0–1)
PLATELET # BLD AUTO: 117 X10(3)/MCL (ref 140–371)
PMV BLD AUTO: 9 FL (ref 9.4–12.4)
POCT GLUCOSE: 112 MG/DL (ref 70–110)
POCT GLUCOSE: 174 MG/DL (ref 70–110)
POCT GLUCOSE: 92 MG/DL (ref 70–110)
POCT GLUCOSE: 94 MG/DL (ref 70–110)
RBC # BLD AUTO: 2.92 X10(6)/MCL (ref 4–6)
WBC # SPEC AUTO: 4.31 X10(3)/MCL (ref 4–11.5)

## 2023-05-06 PROCEDURE — C9113 INJ PANTOPRAZOLE SODIUM, VIA: HCPCS | Performed by: FAMILY MEDICINE

## 2023-05-06 PROCEDURE — 85025 COMPLETE CBC W/AUTO DIFF WBC: CPT | Performed by: FAMILY MEDICINE

## 2023-05-06 PROCEDURE — 63600175 PHARM REV CODE 636 W HCPCS: Performed by: FAMILY MEDICINE

## 2023-05-06 PROCEDURE — 36415 COLL VENOUS BLD VENIPUNCTURE: CPT | Performed by: FAMILY MEDICINE

## 2023-05-06 PROCEDURE — 25000003 PHARM REV CODE 250: Performed by: INTERNAL MEDICINE

## 2023-05-06 PROCEDURE — 94761 N-INVAS EAR/PLS OXIMETRY MLT: CPT

## 2023-05-06 PROCEDURE — 25000003 PHARM REV CODE 250: Performed by: FAMILY MEDICINE

## 2023-05-06 PROCEDURE — 11000001 HC ACUTE MED/SURG PRIVATE ROOM

## 2023-05-06 PROCEDURE — 85014 HEMATOCRIT: CPT | Performed by: FAMILY MEDICINE

## 2023-05-06 PROCEDURE — 99900035 HC TECH TIME PER 15 MIN (STAT)

## 2023-05-06 RX ORDER — TRAZODONE HYDROCHLORIDE 50 MG/1
300 TABLET ORAL NIGHTLY
Status: DISCONTINUED | OUTPATIENT
Start: 2023-05-06 | End: 2023-05-08 | Stop reason: HOSPADM

## 2023-05-06 RX ORDER — QUETIAPINE FUMARATE 100 MG/1
200 TABLET, FILM COATED ORAL DAILY
Status: DISCONTINUED | OUTPATIENT
Start: 2023-05-06 | End: 2023-05-08 | Stop reason: HOSPADM

## 2023-05-06 RX ORDER — QUETIAPINE FUMARATE 100 MG/1
300 TABLET, FILM COATED ORAL 2 TIMES DAILY
Status: DISCONTINUED | OUTPATIENT
Start: 2023-05-06 | End: 2023-05-08 | Stop reason: HOSPADM

## 2023-05-06 RX ADMIN — BACLOFEN 10 MG: 10 TABLET ORAL at 09:05

## 2023-05-06 RX ADMIN — MEMANTINE 5 MG: 5 TABLET ORAL at 08:05

## 2023-05-06 RX ADMIN — PANTOPRAZOLE SODIUM 40 MG: 40 INJECTION, POWDER, FOR SOLUTION INTRAVENOUS at 08:05

## 2023-05-06 RX ADMIN — OXYCODONE AND ACETAMINOPHEN 1 TABLET: 10; 325 TABLET ORAL at 04:05

## 2023-05-06 RX ADMIN — DULOXETINE 60 MG: 30 CAPSULE, DELAYED RELEASE ORAL at 09:05

## 2023-05-06 RX ADMIN — BUSPIRONE HYDROCHLORIDE 15 MG: 15 TABLET ORAL at 02:05

## 2023-05-06 RX ADMIN — GABAPENTIN 300 MG: 300 CAPSULE ORAL at 09:05

## 2023-05-06 RX ADMIN — FAMOTIDINE 20 MG: 10 INJECTION, SOLUTION INTRAVENOUS at 08:05

## 2023-05-06 RX ADMIN — BACLOFEN 10 MG: 10 TABLET ORAL at 02:05

## 2023-05-06 RX ADMIN — QUETIAPINE FUMARATE 200 MG: 100 TABLET ORAL at 08:05

## 2023-05-06 RX ADMIN — GABAPENTIN 300 MG: 300 CAPSULE ORAL at 02:05

## 2023-05-06 RX ADMIN — BUSPIRONE HYDROCHLORIDE 15 MG: 15 TABLET ORAL at 09:05

## 2023-05-06 RX ADMIN — QUETIAPINE FUMARATE 300 MG: 100 TABLET ORAL at 09:05

## 2023-05-06 RX ADMIN — RANOLAZINE 1000 MG: 500 TABLET, FILM COATED, EXTENDED RELEASE ORAL at 08:05

## 2023-05-06 RX ADMIN — LACTULOSE 30 G: 20 SOLUTION ORAL at 07:05

## 2023-05-06 RX ADMIN — TRAZODONE HYDROCHLORIDE 300 MG: 50 TABLET ORAL at 09:05

## 2023-05-06 RX ADMIN — BUSPIRONE HYDROCHLORIDE 15 MG: 15 TABLET ORAL at 08:05

## 2023-05-06 RX ADMIN — BACLOFEN 10 MG: 10 TABLET ORAL at 08:05

## 2023-05-06 RX ADMIN — SODIUM CHLORIDE: 9 INJECTION, SOLUTION INTRAVENOUS at 06:05

## 2023-05-06 RX ADMIN — DULOXETINE 60 MG: 30 CAPSULE, DELAYED RELEASE ORAL at 08:05

## 2023-05-06 RX ADMIN — FAMOTIDINE 20 MG: 10 INJECTION, SOLUTION INTRAVENOUS at 09:05

## 2023-05-06 RX ADMIN — OXYCODONE AND ACETAMINOPHEN 1 TABLET: 10; 325 TABLET ORAL at 11:05

## 2023-05-06 RX ADMIN — RANOLAZINE 1000 MG: 500 TABLET, FILM COATED, EXTENDED RELEASE ORAL at 09:05

## 2023-05-06 RX ADMIN — GABAPENTIN 300 MG: 300 CAPSULE ORAL at 08:05

## 2023-05-06 RX ADMIN — QUETIAPINE FUMARATE 300 MG: 100 TABLET ORAL at 02:05

## 2023-05-06 RX ADMIN — TAMSULOSIN HYDROCHLORIDE 0.4 MG: 0.4 CAPSULE ORAL at 08:05

## 2023-05-06 RX ADMIN — OXYCODONE AND ACETAMINOPHEN 1 TABLET: 10; 325 TABLET ORAL at 08:05

## 2023-05-06 NOTE — PROGRESS NOTES
Hospital Medicine  Progress Note    Patient Name: Avi Blum  MRN: [id number]  Status: IP- Inpatient   Admission Date: 5/5/2023  Length of Stay: 1  Date of Service: 05/06/2023       CC: hospital follow-up for        SUBJECTIVE     Mr Blum presented to the ER with a complaint of abdominal pain, nausea, vomiting and constipation.  He was recently admitted to this hospital with a similar problem and discharged a couple days ago.  He has a poor baseline functional status and uses a wheelchair for mobility.  He has had some falls in transferring to wheelchair.  He also takes chronic opiate medications for chronic pain.  He has had slowly worsening anemia without any clear signs of overt bleeding.  CT scan performed does show signs of possible esophagitis and gastritis.  He does admit to a history of peptic ulcer disease.     05/06/2023  Npo for Egd  H/H stable     Today: Patient seen and examined at bedside, and chart reviewed.       MEDICATIONS   Scheduled   baclofen  10 mg Oral TID    busPIRone  15 mg Oral TID    DULoxetine  60 mg Oral BID    famotidine (PF)  20 mg Intravenous BID    gabapentin  300 mg Oral TID    memantine  5 mg Oral Daily    pantoprazole  40 mg Intravenous Daily    QUEtiapine  200 mg Oral Daily    QUEtiapine  300 mg Oral BID    ranolazine  1,000 mg Oral BID    tamsulosin  1 capsule Oral Daily    traZODone  300 mg Oral Nightly     Continuous Infusions   sodium chloride 0.9% 125 mL/hr at 05/06/23 0604         PHYSICAL EXAM   VITALS: T 98.5 °F (36.9 °C)   BP (!) 148/79   P 83   RR 20   O2 95 %    GENERAL: Awake and in NAD  LUNGS: CTA B/L  CVS: Normal rate  GI/: Soft, NT, bowel sounds positive.  EXTREMITIES: No peripheral edema  NEURO: AAOx3  PSYCH: Cooperative      LABS   CBC  Recent Labs     05/05/23  1944 05/06/23  0030 05/06/23  0648   WBC 6.22  --  4.31   RBC 2.86*  --  2.92*   HGB 9.1* 9.5* 8.9*   HCT 25.1* 26.3* 26.1*   MCV 87.8  --  89.4   MCH 31.8  --  30.5   MCHC 36.3  --  34.1   RDW  11.6  --  11.7   *  --  117*     CHEM  Recent Labs     05/05/23 1944      K 3.6   CHLORIDE 100   CO2 31   BUN 6.0*   CREATININE 0.81   GLUCOSE 127*   CALCIUM 8.2*   ALBUMIN 3.7   GLOBULIN 2.7   ALKPHOS 53   ALT 16   AST 19   BILITOT 0.6         MICROBIOLOGY     Microbiology Results (last 7 days)       Procedure Component Value Units Date/Time    Blood Culture [[id number]] Collected: 05/05/23 1944    Order Status: Resulted Specimen: Blood from Arm, Right Updated: 05/05/23 1954    Blood Culture [[id number]] Collected: 05/05/23 1950    Order Status: Resulted Specimen: Blood from Arm, Right Updated: 05/05/23 1954              DIAGNOSTICS   CT Abdomen Pelvis  Without Contrast  Narrative: STUDY:CT SCAN OF THE ABDOMEN AND PELVIS WITHOUT INTRAVENOUS CONTRAST    CLINICAL HISTORY & TECHNIQUE:    Jin Puga, RT on 5/5/2023  7:31 PM    PROCEDURE: CT ABD/PEL WO CONT    CLINICAL HX: ER    FEVER AND N/V X 2 DAYS    CHRONIC BACK PAIN    PMH: SMOKER, COPD, DIABETES, HTN, CHANDRAKANT, LT TESTICLE SX (UNSPECIFIED)    IV CONTRAST: NONE    ORAL CONTRAST: NONE    RECTAL CONTRAST: NONE    AXIAL IMAGING @ 5MM INTERVALS W/MULTIPLANAR RECONSTRUCTION OF IMAGES    TOTAL IMAGE NUMBER: [id number]    CTDIvol(mGy): HEAD:  BODY: 27.4    DLP(mGycm): HEAD:  BODY: 1799.9    # CT'S LAST 12 MONTHS: 2    TECH: AJR    PT TRANSPORTED W/O INCIDENT    COMPARISON:  04/29/2023    FINDINGS:    Liver:  No clinically significant abnormalities noted.    Gallbladder/Biliary System:  Compatible with a previous cholecystectomy.    Spleen:  No clinically significant abnormalities noted.    Adrenal glands:  No clinically significant abnormalities noted.    Pancreas:  No clinically significant abnormalities noted.    Kidneys/Urinary Tract:  No clinically significant abnormalities noted.    Urinary bladder:  No clinically significant abnormalities noted.    Prostate gland/uterus and ovaries:  No clinically significant abnormalities noted.    GI tract:  There is thickening  of the esophagus at the esophagogastric junction as well as apparent thickening of the gastric wall with mild the dilated fluid-filled loops of small bowel with a fairly prominent amount of fecal content and gas content in the colon.  Some free fluid is present posteriorly to the urinary bladder.    Vascular structures:  No clinically significant abnormalities noted.    Musculoskeletal structures:  Bony demineralization is present with degenerative and postsurgical findings involving the spine including what appears to be a previous vertebroplasty for a compression fracture at the level of L1.    Miscellaneous: Some free fluid is present posteriorly to the urinary bladder.  Impression: 1. The patient demonstrates thickening of the visible lower portion of the esophagus and apparent thickening of the gastric wall in addition to fluid-filled loops of small bowel with some free fluid posteriorly to the urinary bladder.  Additionally, there is a fairly prominent amount of fecal and gas content in the colon.  I suspect chronic esophagitis/gastritis with possible gastroenteritis in addition to constipation.  Recommend clinical correlation.    2.  Chronic and postsurgical findings as described above.    n/a    CATEGORY:n/a    The following dose reduction techniques are used for all CT at [facility]:    1.   Automated exposure control.    2.   Adjustment of the mA and/or kV according to patient size.    3.   Use of iterative reconstruction technique.    Electronically signed by: Jorge Patel  Date:    05/05/2023  Time:    19:59        ASSESSMENT/PLAN:   1 - Anemia: Possible upper GI bleeding, NPO after midnight and General Surgery consulted.  PPI.  Serial H&H     2 - Constipation: likely due to opiates and immobility, PRN lactulose, IV fluids     3 - Gastritis: PPI     4 - Chronic pain syndrome: will continue home medical management to avoid opiate withdrawal     5 - Tobacco abuse: counseled on  smoking cessation, nicotine patch declined     6 - DM type 2: follow clinically for now, hold metformin     7 - Hypertension: continue home medical management and titrate as indicated to control hypertension            Prophylaxis: no lovenox anemia         Rikki Mcdermott MD  [facility]

## 2023-05-06 NOTE — H&P
[facility]    History & Physical      Patient Name: Avi Blum  MRN: [id number]  Admission Date: 5/5/2023  Attending Physician:  Jose Ramírez MD  Primary Care Provider: Denzel Hemphill MD         Patient information was obtained from patient and ER records.     Subjective:     Principal Problem: Obstipation    Chief Complaint: Nausea, vomiting, constipation    Chief Complaint   Patient presents with    Vomiting     TO ED VIA W/C WITH C/O FEVER AND N/V SINCE LAST NIGHT. HE ALSO HAS CHRONIC BACK PAIN.         HPI: Mr Blum presented to the ER with a complaint of abdominal pain, nausea, vomiting and constipation.  He was recently admitted to this hospital with a similar problem and discharged a couple days ago.  He has a poor baseline functional status and uses a wheelchair for mobility.  He has had some falls in transferring to wheelchair.  He also takes chronic opiate medications for chronic pain.  He has had slowly worsening anemia without any clear signs of overt bleeding.  CT scan performed does show signs of possible esophagitis and gastritis.  He does admit to a history of peptic ulcer disease.        Past Medical History:   Diagnosis Date    Arthritis     COPD (chronic obstructive pulmonary disease)     Diabetes mellitus     Hypertension        Past Surgical History:   Procedure Laterality Date    ANKLE SURGERY Left     BACK SURGERY      CHOLECYSTECTOMY      EYE SURGERY      NECK SURGERY N/A     TESTICLE SURGERY Left        Review of patient's allergies indicates:   Allergen Reactions    Penicillins        No current facility-administered medications on file prior to encounter.     Current Outpatient Medications on File Prior to Encounter   Medication Sig    HYDROcodone-acetaminophen (NORCO)  mg per tablet Take 1 tablet by mouth every 6 (six) hours as needed.    lactulose (CHRONULAC) 10 gram/15 mL solution Take 23 mLs by mouth 3 (three) times daily.    baclofen (LIORESAL)  10 MG tablet Take 10 mg by mouth 3 (three) times daily.    busPIRone (BUSPAR) 15 MG tablet Take 15 mg by mouth 3 (three) times daily.    cloNIDine (CATAPRES) 0.1 MG tablet Take 0.1 mg by mouth every evening.    DULoxetine (CYMBALTA) 60 MG capsule Take 60 mg by mouth 2 (two) times a day.    furosemide (LASIX) 40 MG tablet Take 40 mg by mouth once daily.    gabapentin (NEURONTIN) 300 MG capsule Take 300 mg by mouth 3 (three) times daily.    glimepiride (AMARYL) 2 MG tablet Take 2 mg by mouth once daily.    lactulose (CHRONULAC) 20 gram/30 mL Soln Take 23 mLs (15 g total) by mouth 3 (three) times daily.    meclizine (ANTIVERT) 25 mg tablet Take 25 mg by mouth once daily.    meloxicam (MOBIC) 7.5 MG tablet Take 7.5 mg by mouth 2 (two) times daily as needed.    memantine (NAMENDA) 5 MG Tab Take 5 mg by mouth once daily.    omeprazole (PRILOSEC) 20 MG capsule Take 20 mg by mouth once daily.    oxyCODONE-acetaminophen (PERCOCET)  mg per tablet Take 10 tablets by mouth every 6 to 8 hours as needed.    QUEtiapine (SEROQUEL) 300 MG Tab Take 300 mg by mouth 2 (two) times a day.    ranolazine (RANEXA) 1,000 mg Tb12 Take 1,000 mg by mouth 2 (two) times a day.    tadalafiL (CIALIS) 5 MG tablet Take 5 mg by mouth as needed.    tamsulosin (FLOMAX) 0.4 mg Cap Take 1 capsule by mouth once daily.    terbinafine HCL (LAMISIL) 250 mg tablet Take 250 mg by mouth once daily.    traZODone (DESYREL) 150 MG tablet Take 150 mg by mouth 2 (two) times a day.     Family History    None       Tobacco Use    Smoking status: Every Day     Types: Cigarettes, Vaping with nicotine    Smokeless tobacco: Former   Substance and Sexual Activity    Alcohol use: Not Currently    Drug use: Never    Sexual activity: Not on file     Review of Systems   Constitutional:  Negative for diaphoresis and fever.   HENT:  Negative for congestion and sore throat.    Eyes:  Negative for photophobia and pain.   Respiratory:  Negative for chest tightness and  shortness of breath.    Cardiovascular:  Negative for chest pain and palpitations.   Gastrointestinal:  Positive for abdominal distention, abdominal pain, constipation, nausea and vomiting.   Endocrine: Negative for cold intolerance and heat intolerance.   Genitourinary:  Negative for dysuria and flank pain.   Musculoskeletal:  Positive for gait problem. Negative for myalgias.   Skin:  Negative for pallor and rash.   Allergic/Immunologic: Negative for environmental allergies and food allergies.   Neurological:  Negative for seizures and headaches.   Psychiatric/Behavioral:  Negative for agitation and confusion.      Objective:     Vital Signs (Most Recent):  Temp: 99 °F (37.2 °C) (05/05/23 1901)  Pulse: 73 (05/05/23 2001)  Resp: 17 (05/05/23 2001)  BP: 118/68 (05/05/23 2001)  SpO2: 97 % (05/05/23 2001) Vital Signs (24h Range):  Temp:  [99 °F (37.2 °C)] 99 °F (37.2 °C)  Pulse:  [73-83] 73  Resp:  [17-20] 17  SpO2:  [95 %-97 %] 97 %  BP: (103-118)/(58-68) 118/68     Weight: 123.4 kg (272 lb)  Body mass index is 42.6 kg/m².    Physical Exam  Constitutional:       General: He is not in acute distress.     Appearance: Normal appearance.   HENT:      Head: Normocephalic and atraumatic.      Mouth/Throat:      Mouth: Mucous membranes are dry.   Eyes:      General: No scleral icterus.     Extraocular Movements: Extraocular movements intact.   Cardiovascular:      Rate and Rhythm: Normal rate and regular rhythm.   Pulmonary:      Effort: Pulmonary effort is normal. No respiratory distress.      Breath sounds: No wheezing.   Abdominal:      General: There is distension.      Palpations: Abdomen is soft.   Musculoskeletal:         General: No deformity. Normal range of motion.      Cervical back: Normal range of motion and neck supple.   Skin:     General: Skin is dry.      Coloration: Skin is not jaundiced or pale.   Neurological:      General: No focal deficit present.      Mental Status: He is alert and oriented to person,  place, and time.   Psychiatric:         Mood and Affect: Mood normal.         Behavior: Behavior normal.          Significant Labs: All pertinent labs within the past 24 hours have been reviewed.  A1C: No results for input(s): HGBA1C in the last 4320 hours.  ABGs: No results for input(s): PH, PCO2, HCO3, POCSATURATED, BE, TOTALHB, COHB, METHB, O2HB, POCFIO2, PO2 in the last 48 hours.  Bilirubin:   Recent Labs   Lab 04/24/23  0630 04/29/23  0135 04/29/23  0700 05/05/23 1944   BILITOT 0.4 0.8 0.8 0.6     Blood Culture: No results for input(s): LABBLOO in the last 48 hours.  BMP:   Recent Labs   Lab 05/05/23 1944      K 3.6   CO2 31   BUN 6.0*   CREATININE 0.81   CALCIUM 8.2*     CBC:   Recent Labs   Lab 05/05/23 1944   WBC 6.22   HGB 9.1*   HCT 25.1*   *     CMP:   Recent Labs   Lab 05/05/23 1944      K 3.6   CO2 31   BUN 6.0*   CREATININE 0.81   CALCIUM 8.2*   ALBUMIN 3.7   BILITOT 0.6   ALKPHOS 53   AST 19   ALT 16     Cardiac Markers: No results for input(s): CKMB, MYOGLOBIN, BNP, TROPISTAT in the last 48 hours.  Coagulation: No results for input(s): PT, INR, APTT in the last 48 hours.  Lactic Acid: No results for input(s): LACTATE in the last 48 hours.  Lipase: No results for input(s): LIPASE in the last 48 hours.  Lipid Panel: No results for input(s): CHOL, HDL, LDLCALC, TRIG, CHOLHDL in the last 48 hours.  Magnesium: No results for input(s): MG in the last 48 hours.  Pathology Results  (Last 10 years)      None          POCT Glucose: No results for input(s): POCTGLUCOSE in the last 48 hours.  Prealbumin: No results for input(s): PREALBUMIN in the last 48 hours.  Respiratory Culture: No results for input(s): GSRESP, RESPIRATORYC in the last 48 hours.  Troponin: No results for input(s): TROPONINI, TROPONINIHS in the last 48 hours.  TSH: No results for input(s): TSH in the last 4320 hours.  Urine Culture: No results for input(s): LABURIN in the last 48 hours.  Urine Studies: No results for  input(s): COLORU, APPEARANCEUA, PHUR, SPECGRAV, PROTEINUA, GLUCUA, KETONESU, BILIRUBINUA, OCCULTUA, NITRITE, UROBILINOGEN, LEUKOCYTESUR, RBCUA, WBCUA, BACTERIA, SQUAMEPITHEL, HYALINECASTS in the last 48 hours.    Invalid input(s): ROMAN  Recent Lab Results         05/05/23 1944        Albumin/Globulin Ratio 1.4       Albumin 3.7       Alkaline Phosphatase 53       ALT 16       Anion Gap 7.0       AST 19       Baso # 0.03       Basophil % 0.5       BILIRUBIN TOTAL 0.6       BUN 6.0       BUN/CREAT RATIO 7       Calcium 8.2       Chloride 100       CO2 31       Creatinine 0.81       eGFR >90  Comment:                      EGFR INTERPRETATION    Beginning 8/15/22 we are reporting the eGFRcr calculation as recommended by the [employer]. The eGFRcr equation has similar overall performance characteristics to the older equation, but the values may differ by more than 10% particularly at higher values of eGFRcr and younger adult ages.    NKF stages of chronic kidney disease (CKD)  Stage 1: Kidney damage with normal or increased eGFR (>90 mL/min/1.73 m^2)  Stage 2: Mild reduction in GFR (60-89 mL/min/1.73 m^2)  Stage 3a: Moderate reduction in GFR (45-59 mL/min/1.73 m^2)  Stage 3b: Moderate reduction in GFR (30-44 mL/min/1.73 m^2)  Stage 4: Severe reduction in GFR (15-29 mL/min/1.73 m^2)  Stage 5: Kidney failure (GFR <15 mL/min/1.73 m^2)           Eos # 0.03       Eosinophil % 0.5       Globulin, Total 2.7       Glucose 127       Hematocrit 25.1       Hemoglobin 9.1       Immature Grans (Abs) 0.23       Immature Granulocytes 3.7       Lactate, Jonh 1.6       Lymph # 1.01       LYMPH % 16.2       MCH 31.8       MCHC 36.3       MCV 87.8       Mono # 0.96       Mono % 15.4       MPV 8.9       Neut # 3.96       Neut % 63.7       nRBC 1.1       Platelets 129       Potassium 3.6       PROTEIN TOTAL 6.4       RBC 2.86       RDW 11.6       Sodium 138       WBC 6.22               Significant Imaging: I have  reviewed all pertinent imaging results/findings within the past 24 hours.    Assessment/Plan:     1 - Anemia: Possible upper GI bleeding, NPO after midnight and General Surgery consulted.  PPI.  Serial H&H    2 - Constipation: likely due to opiates and immobility, PRN lactulose, IV fluids    3 - Gastritis: PPI    4 - Chronic pain syndrome: will continue home medical management to avoid opiate withdrawal    5 - Tobacco abuse: counseled on smoking cessation, nicotine patch declined    6 - DM type 2: follow clinically for now, hold metformin    7 - Hypertension: continue home medical management and titrate as indicated to control hypertension    GI/DVT prophylaxis    VTE Risk Mitigation (From admission, onward)           Ordered     IP VTE HIGH RISK PATIENT  Once         05/05/23 2109     Place sequential compression device  Until discontinued         05/05/23 2109                  This Encounter was via Telemedicine from [place], [place]  Patient was located in [place], [place]    Full code    Plan discussed with patient, he is his own decision maker and does understand and concur with plan    Time spent 5/5/2023 was 50 minutes     Jose Ramírez JR, MD  Department of Hospital Medicine   [facility]

## 2023-05-06 NOTE — ED PROVIDER NOTES
Encounter Date: 5/5/2023       History     Chief Complaint   Patient presents with    Vomiting     TO ED VIA W/C WITH C/O FEVER AND N/V SINCE LAST NIGHT. HE ALSO HAS CHRONIC BACK PAIN.      Patient presented to the emergency room with abdominal pain, nausea and vomiting.  Was just discharged from the hospital 2 days ago after a possible small-bowel obstruction.  It was felt to be just due to constipation, and the patient resolved with multiple doses of lactulose.  Patient says since last night, he has been constipated again and nausea vomiting.  Subjective fevers at home.  He feels weak and tired.  He is wheelchair-bound due to neck injury in the past, and takes chronic pain medication for this.    The history is provided by the patient and a relative.   Review of patient's allergies indicates:   Allergen Reactions    Penicillins      Past Medical History:   Diagnosis Date    Arthritis     COPD (chronic obstructive pulmonary disease)     Diabetes mellitus     Hypertension      Past Surgical History:   Procedure Laterality Date    ANKLE SURGERY Left     BACK SURGERY      CHOLECYSTECTOMY      EYE SURGERY      NECK SURGERY N/A     TESTICLE SURGERY Left      History reviewed. No pertinent family history.  Social History     Tobacco Use    Smoking status: Every Day     Types: Cigarettes, Vaping with nicotine    Smokeless tobacco: Former   Substance Use Topics    Alcohol use: Not Currently    Drug use: Never     Review of Systems   Constitutional:  Positive for fever.   HENT:  Negative for sore throat.    Respiratory:  Negative for shortness of breath.    Cardiovascular:  Negative for chest pain.   Gastrointestinal:  Positive for abdominal distention, abdominal pain, constipation, nausea and vomiting.   Genitourinary:  Negative for dysuria.   Musculoskeletal:  Negative for back pain.   Skin:  Negative for rash.   Neurological:  Negative for weakness.   Hematological:  Does not bruise/bleed easily.   All other systems  reviewed and are negative.    Physical Exam     Initial Vitals [05/05/23 1901]   BP Pulse Resp Temp SpO2   (!) 103/58 83 20 99 °F (37.2 °C) 95 %      MAP       --         Physical Exam    Nursing note and vitals reviewed.  Constitutional: Vital signs are normal. He is cooperative.  Non-toxic appearance. He does not appear ill.   Obese white male appears tired but is oriented x4   HENT:   Head: Normocephalic and atraumatic.   Eyes: Conjunctivae and lids are normal.   Neck: Trachea normal. Neck supple.   Cardiovascular:  Normal rate and regular rhythm.  No extrasystoles are present.          Pulmonary/Chest: Breath sounds normal.   Abdominal:   Abdomen distended, mildly tympanic, diffuse mild tenderness noted.  Similar to my exam on previous admission.   Musculoskeletal:         General: Normal range of motion.      Cervical back: Neck supple.     Neurological: He is alert and oriented to person, place, and time. He has normal strength. No cranial nerve deficit or sensory deficit. He displays a negative Romberg sign.   Skin: Skin is warm, dry and intact. Capillary refill takes less than 2 seconds.   Psychiatric: He has a normal mood and affect. His speech is normal and behavior is normal. He is not actively hallucinating. He is attentive.       ED Course   Procedures  Labs Reviewed   COMPREHENSIVE METABOLIC PANEL - Abnormal; Notable for the following components:       Result Value    Glucose Level 127 (*)     Blood Urea Nitrogen 6.0 (*)     Calcium Level Total 8.2 (*)     BUN/Creatinine Ratio 7 (*)     All other components within normal limits   CBC WITH DIFFERENTIAL - Abnormal; Notable for the following components:    RBC 2.86 (*)     Hgb 9.1 (*)     Hct 25.1 (*)     Platelet 129 (*)     MPV 8.9 (*)     Lymph % 16.2 (*)     Mono % 15.4 (*)     Eos % 0.5 (*)     Lymph # 1.01 (*)     Mono # 0.96 (*)     Eos # 0.03 (*)     IG# 0.23 (*)     IG% 3.7 (*)     NRBC% 1.1 (*)     All other components within normal limits    LACTIC ACID, PLASMA - Normal   BLOOD CULTURE [facility]   BLOOD CULTURE [facility]   CBC W/ AUTO DIFFERENTIAL    Narrative:     The following orders were created for panel order CBC Auto Differential.  Procedure                               Abnormality         Status                     ---------                               -----------         ------                     CBC with Differential[[id number]]        Abnormal            Final result                 Please view results for these tests on the individual orders.          Imaging Results              CT Abdomen Pelvis  Without Contrast (Final result)  Result time 05/05/23 19:59:42      Final result by Jorge Patel MD (05/05/23 19:59:42)                   Impression:        1. The patient demonstrates thickening of the visible lower portion of the esophagus and apparent thickening of the gastric wall in addition to fluid-filled loops of small bowel with some free fluid posteriorly to the urinary bladder.  Additionally, there is a fairly prominent amount of fecal and gas content in the colon.  I suspect chronic esophagitis/gastritis with possible gastroenteritis in addition to constipation.  Recommend clinical correlation.    2.  Chronic and postsurgical findings as described above.    n/a    CATEGORY:n/a    The following dose reduction techniques are used for all CT at [facility]:    1.   Automated exposure control.    2.   Adjustment of the mA and/or kV according to patient size.    3.   Use of iterative reconstruction technique.      Electronically signed by: Jorge Patel  Date:    05/05/2023  Time:    19:59               Narrative:      STUDY:CT SCAN OF THE ABDOMEN AND PELVIS WITHOUT INTRAVENOUS CONTRAST    CLINICAL HISTORY & TECHNIQUE:    Jin Puga, RT on 5/5/2023  7:31 PM    PROCEDURE: CT ABD/PEL WO CONT    CLINICAL HX: ER    FEVER AND N/V X 2 DAYS    CHRONIC BACK PAIN    PMH: SMOKER, COPD, DIABETES, HTN, CHANDRAKANT, LT TESTICLE SX  (UNSPECIFIED)    IV CONTRAST: NONE    ORAL CONTRAST: NONE    RECTAL CONTRAST: NONE    AXIAL IMAGING @ 5MM INTERVALS W/MULTIPLANAR RECONSTRUCTION OF IMAGES    TOTAL IMAGE NUMBER: 306    CTDIvol(mGy): HEAD:  BODY: 27.4    DLP(mGycm): HEAD:  BODY: 1799.9    # CT'S LAST 12 MONTHS: 2    TECH: AJR    PT TRANSPORTED W/O INCIDENT    COMPARISON:  04/29/2023    FINDINGS:    Liver:  No clinically significant abnormalities noted.    Gallbladder/Biliary System:  Compatible with a previous cholecystectomy.    Spleen:  No clinically significant abnormalities noted.    Adrenal glands:  No clinically significant abnormalities noted.    Pancreas:  No clinically significant abnormalities noted.    Kidneys/Urinary Tract:  No clinically significant abnormalities noted.    Urinary bladder:  No clinically significant abnormalities noted.    Prostate gland/uterus and ovaries:  No clinically significant abnormalities noted.    GI tract:  There is thickening of the esophagus at the esophagogastric junction as well as apparent thickening of the gastric wall with mild the dilated fluid-filled loops of small bowel with a fairly prominent amount of fecal content and gas content in the colon.  Some free fluid is present posteriorly to the urinary bladder.    Vascular structures:  No clinically significant abnormalities noted.    Musculoskeletal structures:  Bony demineralization is present with degenerative and postsurgical findings involving the spine including what appears to be a previous vertebroplasty for a compression fracture at the level of L1.    Miscellaneous: Some free fluid is present posteriorly to the urinary bladder.                                       Medications   sodium chloride 0.9% bolus 1,000 mL 1,000 mL (1,000 mLs Intravenous New Bag 5/5/23 1944)   pantoprazole injection 40 mg (40 mg Intravenous Given 5/5/23 2041)   famotidine (PF) injection 20 mg (20 mg Intravenous Given 5/5/23 2043)     Medical Decision Making:   Initial  Assessment:   Abdominal pain  Differential Diagnosis:   Small-bowel obstruction, GI bleed, UTI, ileus  Clinical Tests:   Lab Tests: Ordered and Reviewed  Radiological Study: Ordered and Reviewed  Medical Tests: Ordered and Reviewed  ED Management:  Lab work shows progressive drop in the RBCs from 11 week ago to 9.8 in the middle of his stay here to now 9.1.  CT consistent with constipation and/or gastroenteritis, but has thickened esophagus and gastric wall.  Probably consistent with erosive gastritis versus peptic ulcer.    Consulted Dr. Charles, recommended clear liquid diet serial H&Hs IV Pepcid and Protonix and he will evaluate the patient in the morning.                        Clinical Impression:   Final diagnoses:  [R11.2] Nausea and vomiting, unspecified vomiting type  [K29.70] Gastritis, presence of bleeding unspecified, unspecified chronicity, unspecified gastritis type (Primary)  [R10.84] Generalized abdominal pain        ED Disposition Condition    Admit Stable                Pro Owusu MD  05/05/23 2041       Pro Owusu MD  05/05/23 2108

## 2023-05-07 ENCOUNTER — ANESTHESIA EVENT (OUTPATIENT)
Dept: GASTROENTEROLOGY | Facility: HOSPITAL | Age: 63
DRG: 392 | End: 2023-05-07
Payer: MEDICARE

## 2023-05-07 ENCOUNTER — ANESTHESIA (OUTPATIENT)
Dept: GASTROENTEROLOGY | Facility: HOSPITAL | Age: 63
DRG: 392 | End: 2023-05-07
Payer: MEDICARE

## 2023-05-07 PROBLEM — R10.84 GENERALIZED ABDOMINAL PAIN: Status: ACTIVE | Noted: 2023-05-07

## 2023-05-07 LAB
ABS NEUT CALC (OHS): 1.64 X10(3)/MCL (ref 2.1–9.2)
ANION GAP SERPL CALC-SCNC: 5 MEQ/L (ref 2–13)
BUN SERPL-MCNC: 4 MG/DL (ref 7–20)
CALCIUM SERPL-MCNC: 8 MG/DL (ref 8.4–10.2)
CHLORIDE SERPL-SCNC: 107 MMOL/L (ref 98–110)
CO2 SERPL-SCNC: 28 MMOL/L (ref 21–32)
CREAT SERPL-MCNC: 0.63 MG/DL (ref 0.66–1.25)
CREAT/UREA NIT SERPL: 6 (ref 12–20)
EOSINOPHIL NFR BLD MANUAL: 0.03 X10(3)/MCL (ref 0–0.9)
EOSINOPHIL NFR BLD MANUAL: 1 % (ref 0–8)
ERYTHROCYTE [DISTWIDTH] IN BLOOD BY AUTOMATED COUNT: 12 %
GFR SERPLBLD CREATININE-BSD FMLA CKD-EPI: >90 MLS/MIN/1.73/M2
GLUCOSE SERPL-MCNC: 97 MG/DL (ref 70–115)
HCT VFR BLD AUTO: 24 % (ref 36–52)
HGB BLD-MCNC: 8.5 G/DL (ref 13–18)
IMM GRANULOCYTES # BLD AUTO: 0.12 X10(3)/MCL (ref 0–0.03)
IMM GRANULOCYTES NFR BLD AUTO: 3.9 % (ref 0–0.5)
LYMPHOCYTES NFR BLD MANUAL: 0.97 X10(3)/MCL
LYMPHOCYTES NFR BLD MANUAL: 32 % (ref 13–40)
MCH RBC QN AUTO: 31.4 PG (ref 27–34)
MCHC RBC AUTO-ENTMCNC: 35.4 G/DL (ref 31–37)
MCV RBC AUTO: 88.6 FL (ref 79–99)
MONOCYTES NFR BLD MANUAL: 0.4 X10(3)/MCL (ref 0.1–1.3)
MONOCYTES NFR BLD MANUAL: 13 % (ref 2–11)
NEUTROPHILS NFR BLD MANUAL: 54 % (ref 47–80)
NRBC BLD AUTO-RTO: 0.7 % (ref 0–1)
PLATELET # BLD AUTO: 125 X10(3)/MCL (ref 140–371)
PLATELET # BLD EST: ABNORMAL 10*3/UL
PMV BLD AUTO: 9.4 FL (ref 9.4–12.4)
POCT GLUCOSE: 103 MG/DL (ref 70–110)
POCT GLUCOSE: 86 MG/DL (ref 70–110)
POCT GLUCOSE: 91 MG/DL (ref 70–110)
POTASSIUM SERPL-SCNC: 3.9 MMOL/L (ref 3.5–5.1)
RBC # BLD AUTO: 2.71 X10(6)/MCL (ref 4–6)
RBC MORPH BLD: NORMAL
SODIUM SERPL-SCNC: 140 MMOL/L (ref 135–145)
WBC # SPEC AUTO: 3.04 X10(3)/MCL (ref 4–11.5)

## 2023-05-07 PROCEDURE — 11000001 HC ACUTE MED/SURG PRIVATE ROOM

## 2023-05-07 PROCEDURE — 85025 COMPLETE CBC W/AUTO DIFF WBC: CPT | Performed by: FAMILY MEDICINE

## 2023-05-07 PROCEDURE — 37000008 HC ANESTHESIA 1ST 15 MINUTES

## 2023-05-07 PROCEDURE — 63600175 PHARM REV CODE 636 W HCPCS: Performed by: NURSE ANESTHETIST, CERTIFIED REGISTERED

## 2023-05-07 PROCEDURE — 25000003 PHARM REV CODE 250: Performed by: INTERNAL MEDICINE

## 2023-05-07 PROCEDURE — 63600175 PHARM REV CODE 636 W HCPCS: Performed by: FAMILY MEDICINE

## 2023-05-07 PROCEDURE — D9220A PRA ANESTHESIA: Mod: ,,, | Performed by: NURSE ANESTHETIST, CERTIFIED REGISTERED

## 2023-05-07 PROCEDURE — 80048 BASIC METABOLIC PNL TOTAL CA: CPT | Performed by: FAMILY MEDICINE

## 2023-05-07 PROCEDURE — 94761 N-INVAS EAR/PLS OXIMETRY MLT: CPT

## 2023-05-07 PROCEDURE — 25000003 PHARM REV CODE 250: Performed by: FAMILY MEDICINE

## 2023-05-07 PROCEDURE — 85027 COMPLETE CBC AUTOMATED: CPT | Performed by: FAMILY MEDICINE

## 2023-05-07 PROCEDURE — 25000003 PHARM REV CODE 250: Performed by: NURSE ANESTHETIST, CERTIFIED REGISTERED

## 2023-05-07 PROCEDURE — C9113 INJ PANTOPRAZOLE SODIUM, VIA: HCPCS | Performed by: FAMILY MEDICINE

## 2023-05-07 PROCEDURE — D9220A PRA ANESTHESIA: ICD-10-PCS | Mod: ,,, | Performed by: NURSE ANESTHETIST, CERTIFIED REGISTERED

## 2023-05-07 PROCEDURE — 25000003 PHARM REV CODE 250: Performed by: SURGERY

## 2023-05-07 PROCEDURE — 43239 EGD BIOPSY SINGLE/MULTIPLE: CPT | Performed by: SURGERY

## 2023-05-07 PROCEDURE — 99900035 HC TECH TIME PER 15 MIN (STAT)

## 2023-05-07 PROCEDURE — 36415 COLL VENOUS BLD VENIPUNCTURE: CPT | Performed by: FAMILY MEDICINE

## 2023-05-07 PROCEDURE — 37000009 HC ANESTHESIA EA ADD 15 MINS

## 2023-05-07 RX ORDER — MUPIROCIN 20 MG/G
1 OINTMENT TOPICAL 2 TIMES DAILY
Status: DISCONTINUED | OUTPATIENT
Start: 2023-05-07 | End: 2023-05-08 | Stop reason: HOSPADM

## 2023-05-07 RX ORDER — PROPOFOL 10 MG/ML
VIAL (ML) INTRAVENOUS
Status: DISCONTINUED | OUTPATIENT
Start: 2023-05-07 | End: 2023-05-07

## 2023-05-07 RX ORDER — SODIUM CHLORIDE 9 MG/ML
INJECTION, SOLUTION INTRAVENOUS CONTINUOUS
Status: DISCONTINUED | OUTPATIENT
Start: 2023-05-07 | End: 2023-05-07

## 2023-05-07 RX ORDER — HYDROCODONE BITARTRATE AND ACETAMINOPHEN 5; 325 MG/1; MG/1
1 TABLET ORAL EVERY 4 HOURS PRN
Status: DISCONTINUED | OUTPATIENT
Start: 2023-05-07 | End: 2023-05-08 | Stop reason: HOSPADM

## 2023-05-07 RX ORDER — HYDRALAZINE HYDROCHLORIDE 20 MG/ML
10 INJECTION INTRAMUSCULAR; INTRAVENOUS EVERY 4 HOURS PRN
Status: DISCONTINUED | OUTPATIENT
Start: 2023-05-07 | End: 2023-05-08 | Stop reason: HOSPADM

## 2023-05-07 RX ORDER — LIDOCAINE HYDROCHLORIDE 20 MG/ML
INJECTION INTRAVENOUS
Status: DISCONTINUED | OUTPATIENT
Start: 2023-05-07 | End: 2023-05-07

## 2023-05-07 RX ADMIN — TRAZODONE HYDROCHLORIDE 300 MG: 50 TABLET ORAL at 09:05

## 2023-05-07 RX ADMIN — QUETIAPINE FUMARATE 200 MG: 100 TABLET ORAL at 08:05

## 2023-05-07 RX ADMIN — PANTOPRAZOLE SODIUM 40 MG: 40 INJECTION, POWDER, FOR SOLUTION INTRAVENOUS at 08:05

## 2023-05-07 RX ADMIN — RANOLAZINE 1000 MG: 500 TABLET, FILM COATED, EXTENDED RELEASE ORAL at 09:05

## 2023-05-07 RX ADMIN — OXYCODONE AND ACETAMINOPHEN 1 TABLET: 10; 325 TABLET ORAL at 04:05

## 2023-05-07 RX ADMIN — PROPOFOL 110 MG: 10 INJECTION, EMULSION INTRAVENOUS at 03:05

## 2023-05-07 RX ADMIN — BUSPIRONE HYDROCHLORIDE 15 MG: 15 TABLET ORAL at 08:05

## 2023-05-07 RX ADMIN — BACLOFEN 10 MG: 10 TABLET ORAL at 09:05

## 2023-05-07 RX ADMIN — SODIUM CHLORIDE: 9 INJECTION, SOLUTION INTRAVENOUS at 09:05

## 2023-05-07 RX ADMIN — MUPIROCIN 1 G: 20 OINTMENT TOPICAL at 09:05

## 2023-05-07 RX ADMIN — BACLOFEN 10 MG: 10 TABLET ORAL at 04:05

## 2023-05-07 RX ADMIN — DULOXETINE 60 MG: 30 CAPSULE, DELAYED RELEASE ORAL at 09:05

## 2023-05-07 RX ADMIN — MEMANTINE 5 MG: 5 TABLET ORAL at 09:05

## 2023-05-07 RX ADMIN — BACLOFEN 10 MG: 10 TABLET ORAL at 08:05

## 2023-05-07 RX ADMIN — BUSPIRONE HYDROCHLORIDE 15 MG: 15 TABLET ORAL at 09:05

## 2023-05-07 RX ADMIN — FAMOTIDINE 20 MG: 10 INJECTION, SOLUTION INTRAVENOUS at 09:05

## 2023-05-07 RX ADMIN — BUSPIRONE HYDROCHLORIDE 15 MG: 15 TABLET ORAL at 04:05

## 2023-05-07 RX ADMIN — GABAPENTIN 300 MG: 300 CAPSULE ORAL at 09:05

## 2023-05-07 RX ADMIN — GABAPENTIN 300 MG: 300 CAPSULE ORAL at 04:05

## 2023-05-07 RX ADMIN — LIDOCAINE HYDROCHLORIDE 100 MG: 20 INJECTION, SOLUTION INTRAVENOUS at 03:05

## 2023-05-07 RX ADMIN — HYDRALAZINE HYDROCHLORIDE 10 MG: 20 INJECTION INTRAMUSCULAR; INTRAVENOUS at 04:05

## 2023-05-07 RX ADMIN — QUETIAPINE FUMARATE 300 MG: 100 TABLET ORAL at 09:05

## 2023-05-07 RX ADMIN — FAMOTIDINE 20 MG: 10 INJECTION, SOLUTION INTRAVENOUS at 08:05

## 2023-05-07 RX ADMIN — TAMSULOSIN HYDROCHLORIDE 0.4 MG: 0.4 CAPSULE ORAL at 09:05

## 2023-05-07 RX ADMIN — OXYCODONE AND ACETAMINOPHEN 1 TABLET: 10; 325 TABLET ORAL at 09:05

## 2023-05-07 RX ADMIN — DULOXETINE 60 MG: 30 CAPSULE, DELAYED RELEASE ORAL at 08:05

## 2023-05-07 RX ADMIN — OXYCODONE AND ACETAMINOPHEN 1 TABLET: 10; 325 TABLET ORAL at 10:05

## 2023-05-07 NOTE — PROGRESS NOTES
Hospital Medicine  Progress Note    Patient Name: Avi Blum  MRN: [id number]  Status: IP- Inpatient   Admission Date: 5/5/2023  Length of Stay: 2  Date of Service: 05/07/2023       CC: hospital follow-up for        SUBJECTIVE     Mr Blum presented to the ER with a complaint of abdominal pain, nausea, vomiting and constipation.  He was recently admitted to this hospital with a similar problem and discharged a couple days ago.  He has a poor baseline functional status and uses a wheelchair for mobility.  He has had some falls in transferring to wheelchair.  He also takes chronic opiate medications for chronic pain.  He has had slowly worsening anemia without any clear signs of overt bleeding.  CT scan performed does show signs of possible esophagitis and gastritis.  He does admit to a history of peptic ulcer disease.      05/06/2023  Npo for Egd  H/H stable     05/07/2023  Egd today   H/H down trend 8.5 & 24.0    Today: Patient seen and examined at bedside, and chart reviewed.       MEDICATIONS   Scheduled   baclofen  10 mg Oral TID    busPIRone  15 mg Oral TID    DULoxetine  60 mg Oral BID    famotidine (PF)  20 mg Intravenous BID    gabapentin  300 mg Oral TID    memantine  5 mg Oral Daily    pantoprazole  40 mg Intravenous Daily    QUEtiapine  200 mg Oral Daily    QUEtiapine  300 mg Oral BID    ranolazine  1,000 mg Oral BID    tamsulosin  1 capsule Oral Daily    traZODone  300 mg Oral Nightly     Continuous Infusions   sodium chloride 0.9% 75 mL/hr at 05/07/23 1036         PHYSICAL EXAM   VITALS: T 97.9 °F (36.6 °C)   BP (!) 187/100   P 77   RR 20   O2 96 %    GENERAL: Awake and in NAD  LUNGS: CTA B/L  CVS: Normal rate  GI/: Soft, NT, bowel sounds positive.  EXTREMITIES: No peripheral edema  NEURO: AAOx3  PSYCH: Cooperative      LABS   CBC  Recent Labs     05/06/23  0648 05/07/23  0607   WBC 4.31 3.04*   RBC 2.92* 2.71*   HGB 8.9* 8.5*   HCT 26.1* 24.0*   MCV 89.4 88.6   MCH 30.5 31.4   MCHC 34.1 35.4   RDW  11.7 12.0   * 125*     CHEM  Recent Labs     05/05/23 1944 05/07/23  0607    140   K 3.6 3.9   CHLORIDE 100 107   CO2 31 28   BUN 6.0* 4.0*   CREATININE 0.81 0.63*   GLUCOSE 127* 97   CALCIUM 8.2* 8.0*   ALBUMIN 3.7  --    GLOBULIN 2.7  --    ALKPHOS 53  --    ALT 16  --    AST 19  --    BILITOT 0.6  --          MICROBIOLOGY     Microbiology Results (last 7 days)       Procedure Component Value Units Date/Time    Blood Culture [[id number]] Collected: 05/05/23 1944    Order Status: Completed Specimen: Blood from Arm, Right Updated: 05/06/23 2101     CULTURE, BLOOD ([facility]) No Growth At 24 Hours    Blood Culture [[id number]] Collected: 05/05/23 1950    Order Status: Completed Specimen: Blood from Arm, Right Updated: 05/06/23 2101     CULTURE, BLOOD ([facility]) No Growth At 24 Hours              DIAGNOSTICS   CT Abdomen Pelvis  Without Contrast  Narrative: STUDY:CT SCAN OF THE ABDOMEN AND PELVIS WITHOUT INTRAVENOUS CONTRAST    CLINICAL HISTORY & TECHNIQUE:    Jin Puga RT on 5/5/2023  7:31 PM    PROCEDURE: CT ABD/PEL WO CONT    CLINICAL HX: ER    FEVER AND N/V X 2 DAYS    CHRONIC BACK PAIN    PMH: SMOKER, COPD, DIABETES, HTN, CHANDRAKANT, LT TESTICLE SX (UNSPECIFIED)    IV CONTRAST: NONE    ORAL CONTRAST: NONE    RECTAL CONTRAST: NONE    AXIAL IMAGING @ 5MM INTERVALS W/MULTIPLANAR RECONSTRUCTION OF IMAGES    TOTAL IMAGE NUMBER: 306    CTDIvol(mGy): HEAD:  BODY: 27.4    DLP(mGycm): HEAD:  BODY: 1799.9    # CT'S LAST 12 MONTHS: 2    TECH: AJR    PT TRANSPORTED W/O INCIDENT    COMPARISON:  04/29/2023    FINDINGS:    Liver:  No clinically significant abnormalities noted.    Gallbladder/Biliary System:  Compatible with a previous cholecystectomy.    Spleen:  No clinically significant abnormalities noted.    Adrenal glands:  No clinically significant abnormalities noted.    Pancreas:  No clinically significant abnormalities noted.    Kidneys/Urinary Tract:  No clinically significant abnormalities noted.    Urinary bladder:   No clinically significant abnormalities noted.    Prostate gland/uterus and ovaries:  No clinically significant abnormalities noted.    GI tract:  There is thickening of the esophagus at the esophagogastric junction as well as apparent thickening of the gastric wall with mild the dilated fluid-filled loops of small bowel with a fairly prominent amount of fecal content and gas content in the colon.  Some free fluid is present posteriorly to the urinary bladder.    Vascular structures:  No clinically significant abnormalities noted.    Musculoskeletal structures:  Bony demineralization is present with degenerative and postsurgical findings involving the spine including what appears to be a previous vertebroplasty for a compression fracture at the level of L1.    Miscellaneous: Some free fluid is present posteriorly to the urinary bladder.  Impression: 1. The patient demonstrates thickening of the visible lower portion of the esophagus and apparent thickening of the gastric wall in addition to fluid-filled loops of small bowel with some free fluid posteriorly to the urinary bladder.  Additionally, there is a fairly prominent amount of fecal and gas content in the colon.  I suspect chronic esophagitis/gastritis with possible gastroenteritis in addition to constipation.  Recommend clinical correlation.    2.  Chronic and postsurgical findings as described above.    n/a    CATEGORY:n/a    The following dose reduction techniques are used for all CT at [facility]:    1.   Automated exposure control.    2.   Adjustment of the mA and/or kV according to patient size.    3.   Use of iterative reconstruction technique.    Electronically signed by: Jorge Patel  Date:    05/05/2023  Time:    19:59        ASSESSMENT/PLAN:   1 - Anemia: Possible upper GI bleeding, NPO after midnight and General Surgery consulted.  PPI.  Serial H&H     2 - Constipation: likely due to opiates and immobility, PRN lactulose, IV  fluids     3 - Gastritis: PPI     4 - Chronic pain syndrome: will continue home medical management to avoid opiate withdrawal     5 - Tobacco abuse: counseled on smoking cessation, nicotine patch declined     6 - DM type 2: follow clinically for now, hold metformin     7 - Hypertension: continue home medical management and titrate as indicated to control hypertension          Prophylaxis: no lovenox anemia concern gi bleed        Rikki Mcdermott MD  [facility]

## 2023-05-07 NOTE — ANESTHESIA PREPROCEDURE EVALUATION
05/07/2023  Avi Blum is a 62 y.o., male.      Pre-op Assessment    I have reviewed the Patient Summary Reports.     I have reviewed the Nursing Notes. I have reviewed the NPO Status.   I have reviewed the Medications.     Review of Systems  Anesthesia Hx:  No problems with previous Anesthesia  Denies Family Hx of Anesthesia complications.   Denies Personal Hx of Anesthesia complications.   Hematology/Oncology:  Hematology Normal   Oncology Normal     EENT/Dental:EENT/Dental Normal   Cardiovascular:   Exercise tolerance: good Hypertension, poorly controlled  Hypertension, Essential Hypertension    Pulmonary:   COPD, moderate  Chronic Obstructive Pulmonary Disease (COPD):    Renal/:  Renal/ Normal     Hepatic/GI:  Hepatic/GI Normal  Hepatic/GI Symptoms: abdominal pain, nausea.    Musculoskeletal:  Musculoskeletal Normal    Neurological:  Neurology Normal    Endocrine:   Diabetes, poorly controlled, type 2  Diabetes, Type 2 Diabetes    Dermatological:  Skin Normal    Psych:  Psychiatric Normal           Physical Exam  General: Well nourished, Cooperative, Alert and Oriented    Airway:  Mallampati: II / II  Mouth Opening: Normal  TM Distance: Normal  Tongue: Normal  Neck ROM: Normal ROM    Dental:  Intact, Loose teeth, Dentures        Anesthesia Plan  Type of Anesthesia, risks & benefits discussed:    Anesthesia Type: MAC  Intra-op Monitoring Plan: Standard ASA Monitors  Post Op Pain Control Plan: multimodal analgesia  Induction:  IV  Airway Plan: Direct  Informed Consent: Informed consent signed with the Patient and all parties understand the risks and agree with anesthesia plan.  All questions answered. Patient consented to blood products? Yes  ASA Score: 3 Emergent  Day of Surgery Review of History & Physical: H&P Update referred to the surgeon/provider.I have interviewed and examined the patient. I  have reviewed the patient's H&P dated: There are no significant changes.     Ready For Surgery From Anesthesia Perspective.     .

## 2023-05-07 NOTE — ANESTHESIA POSTPROCEDURE EVALUATION
Anesthesia Post Evaluation    Patient: Avi Blum    Procedure(s) Performed: * No procedures listed *    Final Anesthesia Type: MAC      Patient location during evaluation: floor  Patient participation: Yes- Able to Participate  Level of consciousness: awake and alert, awake and oriented  Post-procedure vital signs: reviewed and stable  Pain management: adequate  Airway patency: patent    PONV status at discharge: No PONV  Anesthetic complications: no      Cardiovascular status: blood pressure returned to baseline  Respiratory status: unassisted, room air and spontaneous ventilation  Hydration status: euvolemic  Follow-up not needed.          Vitals Value Taken Time   /100 05/07/23 1503   Temp 36.6 °C (97.9 °F) 05/07/23 1503   Pulse 77 05/07/23 1503   Resp 20 05/07/23 1503   SpO2 96 % 05/07/23 1503         No case tracking events are documented in the log.      Pain/Emi Score: Pain Rating Prior to Med Admin: 8 (5/7/2023  9:03 AM)  Pain Rating Post Med Admin: 7 (5/7/2023 12:03 AM)

## 2023-05-07 NOTE — H&P (VIEW-ONLY)
Patient is a 62-year-old  male with a history of abdominal pain nausea vomiting constipation recently admitted to the hospital and discharge several days ago.  Patient has a history of being wheelchair-bound lack of mobility has chronic pain that he receives pain management medication for hydrocodone a day along with anxiety depressive medications that include BuSpar Cymbalta Percocet Seroquel Desyrel and hydrocodone.  Patient is on chronic laxatives that include lactulose.  Patient came in with a CT scan suggesting gastritis has possibly source of his abdominal pain.  I was consulted for EGD.  He does have a history of peptic ulcer disease in the past.    Allergic to penicillin causes swelling    Past medical history  1. Osteoarthritis of the lower back and neck  2. Morbid obesity at 5 ft 7280 lb   3. Type 2 diabetes with an A1c in the 7.8 range with the associated neuropathy  4. COPD smokes a pack a day for about 45 years dropped down to about 4 cigarettes a day and vapes 3 mg a day for the last year  5. Hypertension  6. Congestive heart failure  7. Has shingles x2 primarily in the face and hips  8. Benign prostatic hypertrophy followed by Urology in [place]  9. Gastroesophageal reflux disease and peptic ulcer disease  10. Anxiety depression followed by Dr. Arciniega  11. Bipolar disorder followed by Dr. Arciniega  12. Chronic pain management on hydrocodone for 10 mg tablets q.day for at least 30 years after he fractured his back at the age of 33    Surgical history  1. Nasal surgery for a fractured nose  2. Ankle surgery   3. Lower back surgery   4. Cholecystectomy  5. I surgery   6. 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 Had nerve stimulation in 1992 it was removed  9. No EGD or colonoscopy  10. Stress test by Dr. Hill with echo by Dr. AROLDO javier and angiogram by Dr. AROLDO javier via  his wrist    Immunizations  1. Tetanus shot over 5 years ago  2. No flu shot  3. No pneumonia shot  4. COVID-19 shot x2   5. No shingle shot  6. No hepatitis shot    Family history  1. Mother  2. Father    Social history  1. Smokes a pack a day for 35 years now is down to 4 cigarettes a day with vaping of 3 mg nicotine every day for about 1 year   2. Drinks 5 quarts of alcohol beer primarily every week  for 35 years  3. Did cocaine when he was younger for about 20 years joint a day for about 53 years oxycodone since age 33 for at least 30 years 4 times a day  4. Got a DWI in 1985   5. No  service   6. Was in rehab for bipolar disorder mostly hallucinations  7. Has a college level of education and computer size  8. Worked on the [employer] became disabled when he fell and hurt his back as well as his leg  9.  his wife is special EdAssistant  10. Patient has 2 children 1 child is  in fusion [place] , the other child is disabled and in residential for domestic abuse  11. They live in [facility]  12. Primary care provider is Dr. Denzel Hemphill     Review of systems  Twelve point review of systems otherwise unremarkable some midepigastric pain noted      Physical exam   Patient wears glasses has dentures is hard of hearing  Blood pressure 169/100 temperature is 97° pulse is 80 respiratory rate 20  Head ears nose throat is normal   Heart is regular rate rhythm   Lungs are clear to auscultation  Abdomen is soft nontender nondistended patient does have a site where a previous nerve stimulator was placed in the left lower quadrant  Neurologically appears to be intact   Extremities have no clubbing cyanosis edema    Laboratory studies  White count 6.2 hemoglobin 9.1 hematocrit 25.1 platelet count 129  CO2 is 31 BUN 6 with a creatinine of 0.8 renal profile is otherwise unremarkable blood cultures are negative lactate level is 1.6    Assessment  Patient is a 62-year-old  male with a history  of abdominal pain nausea vomiting constipation recently admitted to the hospital and discharge several days ago.  Patient has a history of being wheelchair-bound lack of mobility has chronic pain that he receives pain management medication for hydrocodone a day along with anxiety depressive medications that include BuSpar Cymbalta Percocet Seroquel Desyrel and hydrocodone.  Patient is on chronic laxatives that include lactulose.  Patient came in with a CT scan suggesting gastritis has possibly source of his abdominal pain.  I was consulted for EGD.  He does have a history of peptic ulcer disease in the past.  It should be noted patient is anemic with a hemoglobin of 9.1 hematocrit 25 MCV of 87 MCH of 31 lactate level is 1.6    Plan  1. Endoscopic evaluation of upper and lower gastrointestinal tract with EGD on this hospitalization possible colonoscopy if stools are positive for blood   2. Patient's abdominal exam is not consistent with an acute abdomen at this time

## 2023-05-07 NOTE — PROGRESS NOTES
Patient is a 62-year-old  male with a history of abdominal pain nausea vomiting constipation recently admitted to the hospital and discharge several days ago.  Patient has a history of being wheelchair-bound lack of mobility has chronic pain that he receives pain management medication for hydrocodone a day along with anxiety depressive medications that include BuSpar Cymbalta Percocet Seroquel Desyrel and hydrocodone.  Patient is on chronic laxatives that include lactulose.  Patient came in with a CT scan suggesting gastritis has possibly source of his abdominal pain.  I was consulted for EGD.  He does have a history of peptic ulcer disease in the past.  It should be noted patient is anemic with a hemoglobin of 9.1 hematocrit 25 MCV of 87 MCH of 31 lactate level is 1.6    Plan  1. Endoscopic evaluation of upper and lower gastrointestinal tract with EGD on this hospitalization possible colonoscopy if stools are positive for blood   2. Patient's abdominal exam is not consistent with an acute abdomen at this time new crit     05/06/2023  Patient is hospital day 1 doing well vital signs are stable afebrile patient will have endoscopy done tomorrow overall he seems to be tolerating his liquid diet and progressing appropriately.  He does not have an acute abdomen.

## 2023-05-07 NOTE — CONSULTS
Patient is a 62-year-old  male with a history of abdominal pain nausea vomiting constipation recently admitted to the hospital and discharge several days ago.  Patient has a history of being wheelchair-bound lack of mobility has chronic pain that he receives pain management medication for hydrocodone a day along with anxiety depressive medications that include BuSpar Cymbalta Percocet Seroquel Desyrel and hydrocodone.  Patient is on chronic laxatives that include lactulose.  Patient came in with a CT scan suggesting gastritis has possibly source of his abdominal pain.  I was consulted for EGD.  He does have a history of peptic ulcer disease in the past.    Allergic to penicillin causes swelling    Past medical history  1. Osteoarthritis of the lower back and neck  2. Morbid obesity at 5 ft 7280 lb   3. Type 2 diabetes with an A1c in the 7.8 range with the associated neuropathy  4. COPD smokes a pack a day for about 45 years dropped down to about 4 cigarettes a day and vapes 3 mg a day for the last year  5. Hypertension  6. Congestive heart failure  7. Has shingles x2 primarily in the face and hips  8. Benign prostatic hypertrophy followed by Urology in [place]  9. Gastroesophageal reflux disease and peptic ulcer disease  10. Anxiety depression followed by Dr. Arciniega  11. Bipolar disorder followed by Dr. Arciniega  12. Chronic pain management on hydrocodone for 10 mg tablets q.day for at least 30 years after he fractured his back at the age of 33    Surgical history  1. Nasal surgery for a fractured nose  2. Ankle surgery   3. Lower back surgery   4. Cholecystectomy  5. I surgery   6. 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 Had nerve stimulation in 1992 it was removed  9. No EGD or colonoscopy  10. Stress test by Dr. Hill with echo by Dr. AROLDO javier and angiogram by Dr. AROLDO javier via  his wrist    Immunizations  1. Tetanus shot over 5 years ago  2. No flu shot  3. No pneumonia shot  4. COVID-19 shot x2   5. No shingle shot  6. No hepatitis shot    Family history  1. Mother  2. Father    Social history  1. Smokes a pack a day for 35 years now is down to 4 cigarettes a day with vaping of 3 mg nicotine every day for about 1 year   2. Drinks 5 quarts of alcohol beer primarily every week  for 35 years  3. Did cocaine when he was younger for about 20 years joint a day for about 53 years oxycodone since age 33 for at least 30 years 4 times a day  4. Got a DWI in 1985   5. No  service   6. Was in rehab for bipolar disorder mostly hallucinations  7. Has a college level of education and computer size  8. Worked on the [employer] became disabled when he fell and hurt his back as well as his leg  9.  his wife is special EdAssistant  10. Patient has 2 children 1 child is  in fusion [place] , the other child is disabled and in [facility] for domestic abuse  11. They live in [facility]  12. Primary care provider is Dr. Denzel Hemphill     Review of systems  Twelve point review of systems otherwise unremarkable some midepigastric pain noted      Physical exam   Patient wears glasses has dentures is hard of hearing  Blood pressure 169/100 temperature is 97° pulse is 80 respiratory rate 20  Head ears nose throat is normal   Heart is regular rate rhythm   Lungs are clear to auscultation  Abdomen is soft nontender nondistended patient does have a site where a previous nerve stimulator was placed in the left lower quadrant  Neurologically appears to be intact   Extremities have no clubbing cyanosis edema    Laboratory studies  White count 6.2 hemoglobin 9.1 hematocrit 25.1 platelet count 129  CO2 is 31 BUN 6 with a creatinine of 0.8 renal profile is otherwise unremarkable blood cultures are negative lactate level is 1.6    Assessment  Patient is a 62-year-old  male with a history  of abdominal pain nausea vomiting constipation recently admitted to the hospital and discharge several days ago.  Patient has a history of being wheelchair-bound lack of mobility has chronic pain that he receives pain management medication for hydrocodone a day along with anxiety depressive medications that include BuSpar Cymbalta Percocet Seroquel Desyrel and hydrocodone.  Patient is on chronic laxatives that include lactulose.  Patient came in with a CT scan suggesting gastritis has possibly source of his abdominal pain.  I was consulted for EGD.  He does have a history of peptic ulcer disease in the past.  It should be noted patient is anemic with a hemoglobin of 9.1 hematocrit 25 MCV of 87 MCH of 31 lactate level is 1.6    Plan  1. Endoscopic evaluation of upper and lower gastrointestinal tract with EGD on this hospitalization possible colonoscopy if stools are positive for blood   2. Patient's abdominal exam is not consistent with an acute abdomen at this time

## 2023-05-08 VITALS
BODY MASS INDEX: 44.61 KG/M2 | HEIGHT: 67 IN | OXYGEN SATURATION: 96 % | SYSTOLIC BLOOD PRESSURE: 175 MMHG | HEART RATE: 87 BPM | RESPIRATION RATE: 20 BRPM | TEMPERATURE: 97 F | DIASTOLIC BLOOD PRESSURE: 96 MMHG | WEIGHT: 284.19 LBS

## 2023-05-08 PROBLEM — K29.70 GASTRITIS: Status: ACTIVE | Noted: 2023-05-08

## 2023-05-08 LAB
HCT VFR BLD AUTO: 23.5 % (ref 36–52)
HGB BLD-MCNC: 8.1 G/DL (ref 13–18)
POCT GLUCOSE: 119 MG/DL (ref 70–110)
POCT GLUCOSE: 142 MG/DL (ref 70–110)

## 2023-05-08 PROCEDURE — 36415 COLL VENOUS BLD VENIPUNCTURE: CPT | Performed by: FAMILY MEDICINE

## 2023-05-08 PROCEDURE — 94761 N-INVAS EAR/PLS OXIMETRY MLT: CPT

## 2023-05-08 PROCEDURE — 25000003 PHARM REV CODE 250: Performed by: INTERNAL MEDICINE

## 2023-05-08 PROCEDURE — 25000003 PHARM REV CODE 250: Performed by: FAMILY MEDICINE

## 2023-05-08 PROCEDURE — 99900035 HC TECH TIME PER 15 MIN (STAT)

## 2023-05-08 PROCEDURE — 63600175 PHARM REV CODE 636 W HCPCS: Performed by: FAMILY MEDICINE

## 2023-05-08 PROCEDURE — C9113 INJ PANTOPRAZOLE SODIUM, VIA: HCPCS | Performed by: FAMILY MEDICINE

## 2023-05-08 PROCEDURE — 85014 HEMATOCRIT: CPT | Performed by: FAMILY MEDICINE

## 2023-05-08 RX ORDER — FUROSEMIDE 10 MG/ML
20 INJECTION INTRAMUSCULAR; INTRAVENOUS ONCE
Status: COMPLETED | OUTPATIENT
Start: 2023-05-08 | End: 2023-05-08

## 2023-05-08 RX ORDER — FAMOTIDINE 20 MG/1
20 TABLET, FILM COATED ORAL 2 TIMES DAILY
Status: DISCONTINUED | OUTPATIENT
Start: 2023-05-08 | End: 2023-05-08 | Stop reason: HOSPADM

## 2023-05-08 RX ADMIN — DULOXETINE 60 MG: 30 CAPSULE, DELAYED RELEASE ORAL at 09:05

## 2023-05-08 RX ADMIN — GABAPENTIN 300 MG: 300 CAPSULE ORAL at 03:05

## 2023-05-08 RX ADMIN — RANOLAZINE 1000 MG: 500 TABLET, FILM COATED, EXTENDED RELEASE ORAL at 09:05

## 2023-05-08 RX ADMIN — BACLOFEN 10 MG: 10 TABLET ORAL at 03:05

## 2023-05-08 RX ADMIN — FUROSEMIDE 20 MG: 10 INJECTION, SOLUTION INTRAVENOUS at 01:05

## 2023-05-08 RX ADMIN — GABAPENTIN 300 MG: 300 CAPSULE ORAL at 09:05

## 2023-05-08 RX ADMIN — HYDRALAZINE HYDROCHLORIDE 10 MG: 20 INJECTION INTRAMUSCULAR; INTRAVENOUS at 07:05

## 2023-05-08 RX ADMIN — BUSPIRONE HYDROCHLORIDE 15 MG: 15 TABLET ORAL at 09:05

## 2023-05-08 RX ADMIN — QUETIAPINE FUMARATE 200 MG: 100 TABLET ORAL at 09:05

## 2023-05-08 RX ADMIN — PANTOPRAZOLE SODIUM 40 MG: 40 INJECTION, POWDER, FOR SOLUTION INTRAVENOUS at 09:05

## 2023-05-08 RX ADMIN — QUETIAPINE FUMARATE 300 MG: 100 TABLET ORAL at 01:05

## 2023-05-08 RX ADMIN — TAMSULOSIN HYDROCHLORIDE 0.4 MG: 0.4 CAPSULE ORAL at 09:05

## 2023-05-08 RX ADMIN — BUSPIRONE HYDROCHLORIDE 15 MG: 15 TABLET ORAL at 03:05

## 2023-05-08 RX ADMIN — FAMOTIDINE 20 MG: 20 TABLET ORAL at 04:05

## 2023-05-08 RX ADMIN — MEMANTINE 5 MG: 5 TABLET ORAL at 09:05

## 2023-05-08 RX ADMIN — BACLOFEN 10 MG: 10 TABLET ORAL at 09:05

## 2023-05-08 RX ADMIN — FAMOTIDINE 20 MG: 10 INJECTION, SOLUTION INTRAVENOUS at 09:05

## 2023-05-08 NOTE — PLAN OF CARE
05/08/23 1556   Final Note   Assessment Type Final Discharge Note   Anticipated Discharge Disposition Home   What phone number can be called within the next 1-3 days to see how you are doing after discharge? [phone]   Post-Acute Status   Discharge Delays None known at this time

## 2023-05-08 NOTE — DISCHARGE SUMMARY
[facility]urg  Hospital Medicine  Discharge Summary      Patient Name: Avi Blum  MRN: [id number]  EDWARDO: [id number]  Patient Class: IP- Inpatient  Admission Date: 5/5/2023  Hospital Length of Stay: 3 days  Discharge Date and Time:  05/08/2023 3:47 PM  Attending Physician: Christo Ramírez Jr., MD   Discharging Provider: Elizabeth Andre MD  Primary Care Provider: Denzel Hemphill MD    Primary Care Team: Networked reference to record PCT     HPI:   No notes on file    * No surgery found *      Hospital Course:   05/08/223 pt underwent EGD which was esentially negative for ulcers he did have some gastritis, CT also showed evidence of gastritis.  Pt is feeling much better, had good BM and is ready for d/c home.  He will f/u with Dr. Cahrles to discuss need for colonosocopy       Goals of Care Treatment Preferences:  Code Status: Full Code      Consults:   Consults (From admission, onward)        Status Ordering Provider     IP consult to case management  Once        Provider:  (Not yet assigned)    Acknowledged CHRISTO RAMÍREZ JR     Inpatient virtual consult to Hospital Medicine  Once        Provider:  Rikki Mcdermott MD    Acknowledged ASHISH CHAVEZ     Inpatient consult to General surgery  Once        Provider:  Alexander Charles MD    Acknowledged ASHISH CHAVEZ          No new Assessment & Plan notes have been filed under this hospital service since the last note was generated.  Service: Hospital Medicine    Final Active Diagnoses:    Diagnosis Date Noted POA    PRINCIPAL PROBLEM:  Generalized abdominal pain [R10.84] 05/07/2023 Yes    Gastritis [K29.70] 05/08/2023 Yes    Constipation [K59.00] 04/29/2023 Yes      Problems Resolved During this Admission:       Discharged Condition: good    Disposition: Home or Self Care    Follow Up:   Follow-up Information     Alexander Charles MD Follow up in 1 week(s).    Specialties: General Surgery, Cardiology  Contact information:  [address]  [place] [address]  [phone]             Denzel Hemphill MD Follow up.    Specialty: Family Medicine  Contact information:  [address] [place] [address]  [phone]                       Patient Instructions:   No discharge procedures on file.    Significant Diagnostic Studies: Labs:   BMP:   Recent Labs   Lab 05/07/23  0607      K 3.9   CO2 28   BUN 4.0*   CREATININE 0.63*   CALCIUM 8.0*       Pending Diagnostic Studies:     Procedure Component Value Units Date/Time    Specimen to Pathology [[id number]] Collected: 05/07/23 1532    Order Status: Sent Lab Status: In process Updated: 05/07/23 1828    Specimen: Tissue from Antrum, Tissue from [facility]     Specimen to Pathology Gastrointestinal tract [[id number]] Collected: 05/07/23 1534    Order Status: Sent Lab Status: In process Updated: 05/07/23 1828    Specimen: Tissue          Medications:  Reconciled Home Medications:      Medication List      CONTINUE taking these medications    baclofen 10 MG tablet  Commonly known as: LIORESAL  Take 10 mg by mouth 3 (three) times daily.     busPIRone 15 MG tablet  Commonly known as: BUSPAR  Take 15 mg by mouth 3 (three) times daily.     cloNIDine 0.1 MG tablet  Commonly known as: CATAPRES  Take 0.1 mg by mouth every evening.     DULoxetine 60 MG capsule  Commonly known as: CYMBALTA  Take 60 mg by mouth 2 (two) times a day.     furosemide 40 MG tablet  Commonly known as: LASIX  Take 40 mg by mouth once daily.     gabapentin 300 MG capsule  Commonly known as: NEURONTIN  Take 300 mg by mouth 3 (three) times daily.     glimepiride 2 MG tablet  Commonly known as: AMARYL  Take 2 mg by mouth once daily.     HYDROcodone-acetaminophen  mg per tablet  Commonly known as: NORCO  Take 1 tablet by mouth every 6 (six) hours as needed.     * lactulose 20 gram/30 mL Soln  Commonly known as: CHRONULAC  Take 23 mLs (15 g total) by mouth 3 (three) times daily.     * lactulose 10 gram/15 mL solution  Commonly  known as: CHRONULAC  Take 23 mLs by mouth 3 (three) times daily.     meclizine 25 mg tablet  Commonly known as: ANTIVERT  Take 25 mg by mouth once daily.     meloxicam 7.5 MG tablet  Commonly known as: MOBIC  Take 7.5 mg by mouth 2 (two) times daily as needed.     memantine 5 MG Tab  Commonly known as: NAMENDA  Take 5 mg by mouth once daily.     omeprazole 20 MG capsule  Commonly known as: PRILOSEC  Take 20 mg by mouth once daily.     oxyCODONE-acetaminophen  mg per tablet  Commonly known as: PERCOCET  Take 10 tablets by mouth every 6 to 8 hours as needed.     QUEtiapine 300 MG Tab  Commonly known as: SEROQUEL  Take 300 mg by mouth 2 (two) times a day.     ranolazine 1,000 mg Tb12  Commonly known as: RANEXA  Take 1,000 mg by mouth 2 (two) times a day.     tadalafiL 5 MG tablet  Commonly known as: CIALIS  Take 5 mg by mouth as needed.     tamsulosin 0.4 mg Cap  Commonly known as: FLOMAX  Take 1 capsule by mouth once daily.     terbinafine  mg tablet  Commonly known as: LAMISIL  Take 250 mg by mouth once daily.     traZODone 150 MG tablet  Commonly known as: DESYREL  Take 150 mg by mouth 2 (two) times a day.         * This list has 2 medication(s) that are the same as other medications prescribed for you. Read the directions carefully, and ask your doctor or other care provider to review them with you.                Indwelling Lines/Drains at time of discharge:   Lines/Drains/Airways     None                 Time spent on the discharge of patient: 37 minutes     Physical Exam  Vitals and nursing note reviewed.   Constitutional:       General: He is not in acute distress.     Appearance: Normal appearance. He is obese. He is not ill-appearing.   HENT:      Head: Normocephalic and atraumatic.   Cardiovascular:      Rate and Rhythm: Normal rate and regular rhythm.      Pulses: Normal pulses.      Heart sounds: Normal heart sounds.   Pulmonary:      Effort: Pulmonary effort is normal.      Breath sounds:  Normal breath sounds.   Abdominal:      General: Abdomen is flat. Bowel sounds are normal.      Palpations: Abdomen is soft.   Skin:     General: Skin is warm and dry.      Findings: No erythema or rash.   Neurological:      Mental Status: He is alert.   Psychiatric:      Comments: I had a face to face encounter with this patient prior to discharging         Elizabeth Andre MD  Department of Hospital Medicine  [facility]/Surg

## 2023-05-08 NOTE — PLAN OF CARE
Patient requesting [facility] for home. Explained he would not qualify due to ability to stand and transfer on his own. Patient offered home health with PT and declined

## 2023-05-08 NOTE — INTERVAL H&P NOTE
The patient has been examined and the H&P has been reviewed:    I concur with the findings and no changes have occurred since H&P was written.        Active Hospital Problems    Diagnosis  POA    *Generalized abdominal pain [R10.84]  Unknown      Resolved Hospital Problems   No resolved problems to display.

## 2023-05-08 NOTE — HOSPITAL COURSE
05/08/223 pt underwent EGD which was esentially negative for ulcers he did have some gastritis, CT also showed evidence of gastritis.  Pt is feeling much better, had good BM and is ready for d/c home.  He will f/u with Dr. Charles to discuss need for colonosocopy

## 2023-05-08 NOTE — PLAN OF CARE
05/08/23 1016   Discharge Assessment   Assessment Type Discharge Planning Assessment   Confirmed/corrected address, phone number and insurance Yes   Confirmed Demographics Correct on Facesheet   Source of Information patient   When was your last doctors appointment?   (month ago)   Reason For Admission abd pain   People in Home spouse   Facility Arrived From: home   Do you expect to return to your current living situation? Yes   Do you have help at home or someone to help you manage your care at home? Yes   Who are your caregiver(s) and their phone number(s)? wife Zuly Blum    Prior to hospitilization cognitive status: Alert/Oriented   Current cognitive status: Alert/Oriented   Walking or Climbing Stairs ambulation difficulty, requires equipment;transferring difficulty, requires equipment   Mobility Management walker, wheelchair   Dressing/Bathing bathing difficulty, assistance 1 person;dressing difficulty, assistance 1 person   Dressing/Bathing Management wife   Do you have any problems with: Needs other help   Specify other help wife   Home Layout Able to live on 1st floor   Equipment Currently Used at Home wheelchair;rollator;walker, standard;cane, straight   Readmission within 30 days? No   Patient currently being followed by outpatient case management? No   Do you currently have service(s) that help you manage your care at home? No   Do you take prescription medications? Yes   Do you have prescription coverage? Yes   Coverage MCR   Do you have any problems affording any of your prescribed medications? No   Is the patient taking medications as prescribed? yes   Who is going to help you get home at discharge? wife   How do you get to doctors appointments? family or friend will provide   Are you on dialysis? No   Do you take coumadin? No   Discharge Plan A Home with family;Home Health   DME Needed Upon Discharge  lift device   Discharge Plan discussed with: Spouse/sig other;Patient   Name(s)  and Number(s) Zuly Jonesont   Discharge Barriers Identified None   Physical Activity   On average, how many days per week do you engage in moderate to strenuous exercise (like a brisk walk)? 0 days   On average, how many minutes do you engage in exercise at this level? 0 min   Financial Resource Strain   How hard is it for you to pay for the very basics like food, housing, medical care, and heating? Not hard   Housing Stability   In the last 12 months, was there a time when you were not able to pay the mortgage or rent on time? N   In the last 12 months, how many places have you lived? 1   In the last 12 months, was there a time when you did not have a steady place to sleep or slept in a shelter (including now)? N   Transportation Needs   In the past 12 months, has lack of transportation kept you from medical appointments or from getting medications? no   In the past 12 months, has lack of transportation kept you from meetings, work, or from getting things needed for daily living? No   Food Insecurity   Within the past 12 months, you worried that your food would run out before you got the money to buy more. Never true   Within the past 12 months, the food you bought just didn't last and you didn't have money to get more. Never true   Stress   Do you feel stress - tense, restless, nervous, or anxious, or unable to sleep at night because your mind is troubled all the time - these days? Only a littl   Social Connections   In a typical week, how many times do you talk on the phone with family, friends, or neighbors? Once a week   How often do you get together with friends or relatives? Once   How often do you attend Latter day or Mandaen services? Never   Do you belong to any clubs or organizations such as Latter day groups, unions, fraternal or athletic groups, or school groups? No   How often do you attend meetings of the clubs or organizations you belong to? Never   Are you , , , , never  , or living with a partner?    Alcohol Use   Q1: How often do you have a drink containing alcohol? Never   Q2: How many drinks containing alcohol do you have on a typical day when you are drinking? None   Q3: How often do you have six or more drinks on one occasion? Never     Would like lift at home and has agreed to home health with PT

## 2023-05-08 NOTE — PROGRESS NOTES
"Inpatient Nutrition Evaluation    Admit Date: 5/5/2023   Total duration of encounter: 2 days    Nutrition Recommendation/Prescription     Continue POC, if/when medically feasible ADAT to Diabetic, GI soft diet.    Consider ONS if po intake <50%    Monitor wts, labs, po intake    RD to f/u & adjust MNT accordingly    Nutrition Assessment     Chart Review    Reason Seen: continuous nutrition monitoring    Malnutrition Screening Tool Results   Have you recently lost weight without trying?: No  Have you been eating poorly because of a decreased appetite?: No   MST Score: 0     Diagnosis:  ANEMIA, CONSTIPATION, GASTRITIS, CHRONIC PAIN SYNDROME, TOBACCO ABUSE, DM TYPE 2, HTN    Relevant Medical History: DM, HTN, COPD, PEPTIC ULCER DISEASE, SX: CHOLECYSTECTOMY      Diet Order: Diet clear liquid  Oral Supplement Order: none  Appetite/Oral Intake: not applicable/not applicable  Factors Affecting Nutritional Intake: abdominal pain, altered gastrointestinal function, clear liquid diet, constipation, nausea, and vomiting  Food/Orthodox/Cultural Preferences: unable to obtain  Food Allergies: no known food allergies       Comments    (5/7) 62 Y.O MALE ADMITTED W/ ABD PIN, VOMITTING, & CONSTIPATION PER MD, DIET ADV TO CLD, NO PO INTAKE RECORDED, HAD EGD TODAY,  GENERALIZED EDEMA, ABD OBESE/ FIRM/ TENDER/ +BS- HYPOACTIVE, BM TODAY, NUTR SCORE: 3, BRDN SCORE: 17 PER EMR    Anthropometrics    Height: 5' 7" (170.2 cm) Height Method: Stated  Last Weight: 128.9 kg (284 lb 2.8 oz) (05/05/23 2226) Weight Method: Bed Scale  BMI (Calculated): 44.5  BMI Classification: obese grade III (BMI >/=40)        Ideal Body Weight (IBW), Male: 148 lb     % Ideal Body Weight, Male (lb): 183.78 %                          Usual Weight Provided By: EMR weight history    Wt Readings from Last 5 Encounters:   05/05/23 128.9 kg (284 lb 2.8 oz)   04/30/23 124.1 kg (273 lb 9.6 oz)     Weight Change(s) Since Admission:  Admit Weight: 123.4 kg (272 lb) " (05/05/23 9852)  Unsure of wt accuracy, wt gain on same day poss r/t edema    Patient Education    Not applicable.    Monitoring & Evaluation     Dietitian will monitor food and beverage intake, weight, weight change, and gastrointestinal profile.  Nutrition Risk/Follow-Up: low (follow-up in 5-7 days)  Patients assigned 'low nutrition risk' status do not qualify for a full nutritional assessment but will be monitored and re-evaluated in a 5-7 day time period. Please consult if re-evaluation needed sooner.

## 2023-05-08 NOTE — PLAN OF CARE
05/08/23 1556   Medicare Message   Important Message from Medicare regarding Discharge Appeal Rights Given to patient/caregiver;Explained to patient/caregiver   Date IMM was signed 05/05/23   Time IMM was signed 0136

## 2023-05-10 LAB
BACTERIA BLD CULT: NORMAL
BACTERIA BLD CULT: NORMAL

## 2023-06-05 ENCOUNTER — HOSPITAL ENCOUNTER (OUTPATIENT)
Dept: RADIOLOGY | Facility: HOSPITAL | Age: 63
Discharge: HOME | End: 2023-06-05
Attending: SURGERY
Payer: MEDICARE

## 2023-06-05 ENCOUNTER — HOSPITAL ENCOUNTER (OUTPATIENT)
Dept: PREADMISSION TESTING | Facility: HOSPITAL | Age: 63
Discharge: HOME | End: 2023-06-05
Payer: MEDICARE

## 2023-06-05 VITALS — WEIGHT: 284 LBS | HEIGHT: 67 IN | BODY MASS INDEX: 44.57 KG/M2

## 2023-06-05 DIAGNOSIS — K92.1 BLOOD IN STOOL: Primary | ICD-10-CM

## 2023-06-05 PROCEDURE — 93010 ELECTROCARDIOGRAM REPORT: CPT | Mod: ,,, | Performed by: INTERNAL MEDICINE

## 2023-06-05 PROCEDURE — 71046 X-RAY EXAM CHEST 2 VIEWS: CPT | Mod: TC

## 2023-06-05 PROCEDURE — 93010 EKG 12-LEAD: ICD-10-PCS | Mod: ,,, | Performed by: INTERNAL MEDICINE

## 2023-06-05 PROCEDURE — 93005 ELECTROCARDIOGRAM TRACING: CPT

## 2023-06-05 RX ORDER — BACLOFEN 10 MG/1
10 TABLET ORAL 2 TIMES DAILY
COMMUNITY

## 2023-06-05 RX ORDER — FAMOTIDINE 20 MG/1
20 TABLET, FILM COATED ORAL DAILY
COMMUNITY

## 2023-06-05 RX ORDER — SODIUM CHLORIDE, SODIUM LACTATE, POTASSIUM CHLORIDE, CALCIUM CHLORIDE 600; 310; 30; 20 MG/100ML; MG/100ML; MG/100ML; MG/100ML
INJECTION, SOLUTION INTRAVENOUS CONTINUOUS
Status: CANCELLED | OUTPATIENT
Start: 2023-06-09

## 2023-06-05 RX ORDER — QUETIAPINE FUMARATE 200 MG/1
200 TABLET, FILM COATED ORAL DAILY
COMMUNITY

## 2023-06-05 NOTE — DISCHARGE INSTRUCTIONS

## 2023-06-08 ENCOUNTER — ANESTHESIA EVENT (OUTPATIENT)
Dept: GASTROENTEROLOGY | Facility: HOSPITAL | Age: 63
End: 2023-06-08
Payer: MEDICARE

## 2023-06-08 NOTE — ANESTHESIA PREPROCEDURE EVALUATION
06/08/2023  Avi Blum is a 62 y.o., male.      Pre-op Assessment    I have reviewed the Patient Summary Reports.     I have reviewed the Nursing Notes. I have reviewed the NPO Status.   I have reviewed the Medications.     Review of Systems  Anesthesia Hx:  No problems with previous Anesthesia  Denies Family Hx of Anesthesia complications.   Denies Personal Hx of Anesthesia complications.   Social:  Smoker    Hematology/Oncology:  Hematology Normal   Oncology Normal     EENT/Dental:EENT/Dental Normal   Cardiovascular:   Exercise tolerance: good Hypertension    Pulmonary:   COPD    Renal/:  Renal/ Normal     Hepatic/GI:  Hepatic/GI Normal    Musculoskeletal:  Musculoskeletal Normal    Neurological:   Chronic Pain Syndrome   Endocrine:   Diabetes  Obesity / BMI > 30, Morbid Obesity / BMI > 40  Dermatological:  Skin Normal    Psych:   Psychiatric History anxiety depression          Physical Exam  General: Well nourished, Cooperative, Alert and Oriented    Airway:  Mallampati: II / II  Mouth Opening: Normal  TM Distance: Normal  Tongue: Normal  Neck ROM: Normal ROM    Dental:  Intact        Anesthesia Plan  Type of Anesthesia, risks & benefits discussed:    Anesthesia Type: MAC  Intra-op Monitoring Plan: Standard ASA Monitors  Post Op Pain Control Plan: multimodal analgesia  Induction:  IV  Airway Plan: Direct  Informed Consent: Informed consent signed with the Patient and all parties understand the risks and agree with anesthesia plan.  All questions answered. Patient consented to blood products? Yes  ASA Score: 3  Day of Surgery Review of History & Physical: H&P Update referred to the surgeon/provider.I have interviewed and examined the patient. I have reviewed the patient's H&P dated: There are no significant changes.     Ready For Surgery From Anesthesia Perspective.     .

## 2023-06-09 ENCOUNTER — ANESTHESIA (OUTPATIENT)
Dept: GASTROENTEROLOGY | Facility: HOSPITAL | Age: 63
End: 2023-06-09
Payer: MEDICARE

## 2023-06-09 ENCOUNTER — HOSPITAL ENCOUNTER (OUTPATIENT)
Dept: GASTROENTEROLOGY | Facility: HOSPITAL | Age: 63
Discharge: HOME | End: 2023-06-09
Attending: SURGERY
Payer: MEDICARE

## 2023-06-09 VITALS
SYSTOLIC BLOOD PRESSURE: 115 MMHG | TEMPERATURE: 97 F | DIASTOLIC BLOOD PRESSURE: 86 MMHG | OXYGEN SATURATION: 96 % | HEART RATE: 64 BPM | RESPIRATION RATE: 18 BRPM

## 2023-06-09 DIAGNOSIS — K92.1 BLOOD IN STOOL: Primary | ICD-10-CM

## 2023-06-09 LAB — POCT GLUCOSE: 114 MG/DL (ref 70–110)

## 2023-06-09 PROCEDURE — 82962 GLUCOSE BLOOD TEST: CPT

## 2023-06-09 PROCEDURE — D9220A PRA ANESTHESIA: ICD-10-PCS | Mod: ,,, | Performed by: NURSE ANESTHETIST, CERTIFIED REGISTERED

## 2023-06-09 PROCEDURE — 25000003 PHARM REV CODE 250: Performed by: SURGERY

## 2023-06-09 PROCEDURE — 37000008 HC ANESTHESIA 1ST 15 MINUTES

## 2023-06-09 PROCEDURE — 37000009 HC ANESTHESIA EA ADD 15 MINS

## 2023-06-09 PROCEDURE — 25000003 PHARM REV CODE 250: Performed by: NURSE ANESTHETIST, CERTIFIED REGISTERED

## 2023-06-09 PROCEDURE — 63600175 PHARM REV CODE 636 W HCPCS: Performed by: NURSE ANESTHETIST, CERTIFIED REGISTERED

## 2023-06-09 PROCEDURE — 27201423 OPTIME MED/SURG SUP & DEVICES STERILE SUPPLY

## 2023-06-09 PROCEDURE — S5010 5% DEXTROSE AND 0.45% SALINE: HCPCS | Performed by: SURGERY

## 2023-06-09 PROCEDURE — 45385 COLONOSCOPY W/LESION REMOVAL: CPT | Performed by: SURGERY

## 2023-06-09 PROCEDURE — D9220A PRA ANESTHESIA: Mod: ,,, | Performed by: NURSE ANESTHETIST, CERTIFIED REGISTERED

## 2023-06-09 RX ORDER — LIDOCAINE HYDROCHLORIDE 20 MG/ML
INJECTION INTRAVENOUS
Status: DISCONTINUED | OUTPATIENT
Start: 2023-06-09 | End: 2023-06-09

## 2023-06-09 RX ORDER — SODIUM CHLORIDE, SODIUM LACTATE, POTASSIUM CHLORIDE, CALCIUM CHLORIDE 600; 310; 30; 20 MG/100ML; MG/100ML; MG/100ML; MG/100ML
INJECTION, SOLUTION INTRAVENOUS CONTINUOUS
Status: DISCONTINUED | OUTPATIENT
Start: 2023-06-09 | End: 2023-06-10 | Stop reason: HOSPADM

## 2023-06-09 RX ORDER — GLYCOPYRROLATE 0.2 MG/ML
INJECTION INTRAMUSCULAR; INTRAVENOUS
Status: DISCONTINUED | OUTPATIENT
Start: 2023-06-09 | End: 2023-06-09

## 2023-06-09 RX ORDER — DEXTROSE MONOHYDRATE AND SODIUM CHLORIDE 5; .45 G/100ML; G/100ML
INJECTION, SOLUTION INTRAVENOUS CONTINUOUS
Status: DISCONTINUED | OUTPATIENT
Start: 2023-06-09 | End: 2023-06-10 | Stop reason: HOSPADM

## 2023-06-09 RX ORDER — PROPOFOL 10 MG/ML
VIAL (ML) INTRAVENOUS
Status: DISCONTINUED | OUTPATIENT
Start: 2023-06-09 | End: 2023-06-09

## 2023-06-09 RX ORDER — SODIUM CHLORIDE 9 MG/ML
INJECTION, SOLUTION INTRAVENOUS CONTINUOUS
Status: DISCONTINUED | OUTPATIENT
Start: 2023-06-09 | End: 2023-06-10 | Stop reason: HOSPADM

## 2023-06-09 RX ADMIN — PROPOFOL 30 MG: 10 INJECTION, EMULSION INTRAVENOUS at 01:06

## 2023-06-09 RX ADMIN — PROPOFOL 30 MG: 10 INJECTION, EMULSION INTRAVENOUS at 12:06

## 2023-06-09 RX ADMIN — PROPOFOL 50 MG: 10 INJECTION, EMULSION INTRAVENOUS at 12:06

## 2023-06-09 RX ADMIN — LIDOCAINE HYDROCHLORIDE 75 MG: 20 INJECTION, SOLUTION INTRAVENOUS at 12:06

## 2023-06-09 RX ADMIN — GLYCOPYRROLATE 0.2 MG: 0.2 INJECTION INTRAMUSCULAR; INTRAVENOUS at 12:06

## 2023-06-09 RX ADMIN — PROPOFOL 100 MG: 10 INJECTION, EMULSION INTRAVENOUS at 12:06

## 2023-06-09 RX ADMIN — DEXTROSE AND SODIUM CHLORIDE: 5; 450 INJECTION, SOLUTION INTRAVENOUS at 10:06

## 2023-06-09 NOTE — DISCHARGE INSTRUCTIONS
Follow-up with Dr as instructed  Diet: as tolerated  Activity:  decrease activity today, no driving today, resume all activity tomorrow  Notify MD:  increased swelling of abdomen, excessive nausea/vomiting, excessive bright red bleeding from rectum  Medications:  continue your home medications. Keep a list of your home medications at all times for emergencies.

## 2023-06-09 NOTE — DISCHARGE SUMMARY
[facility]  Discharge Note  Short Stay    Colonoscopy with 6 polyps between the hepatic flexure and the splenic flexure of the transverse colon and ascending colon all removed with a hot loop snare      OUTCOME: Patient tolerated treatment/procedure well without complication and is now ready for discharge.    DISPOSITION: Home or Self Care    FINAL DIAGNOSIS:  Blood in stool    FOLLOWUP: In clinic 1 week    DISCHARGE INSTRUCTIONS:    Discharge Procedure Orders   Diet general        TIME SPENT ON DISCHARGE:  5 minutes

## 2023-06-09 NOTE — ANESTHESIA POSTPROCEDURE EVALUATION
Anesthesia Post Evaluation    Patient: Avi Blum    Procedure(s) Performed: * No procedures listed *    Final Anesthesia Type: MAC      Patient location during evaluation: GI PACU  Patient participation: Yes- Able to Participate  Level of consciousness: awake and alert, awake and oriented  Post-procedure vital signs: reviewed and stable  Pain management: adequate  Airway patency: patent    PONV status at discharge: No PONV  Anesthetic complications: no      Cardiovascular status: blood pressure returned to baseline  Respiratory status: unassisted, room air and spontaneous ventilation  Hydration status: euvolemic  Follow-up not needed.          Vitals Value Taken Time   /79 06/09/23 0948   Temp 36.4 °C (97.5 °F) 06/09/23 0948   Pulse 76 06/09/23 0948   Resp 20 06/09/23 0948   SpO2 99 % 06/09/23 0948         No case tracking events are documented in the log.      Pain/Emi Score: No data recorded

## 2023-06-12 NOTE — DISCHARGE SUMMARY
[facility]  Discharge Note  Short Stay    Colonoscopy      OUTCOME: Patient tolerated treatment/procedure well without complication and is now ready for discharge.    DISPOSITION: Home or Self Care    FINAL DIAGNOSIS:  Blood in stool    FOLLOWUP: In clinic    DISCHARGE INSTRUCTIONS:    Discharge Procedure Orders   Diet general         Clinical Reference Documents Added to Patient Instructions         Document    COLONOSCOPY DISCHARGE INSTRUCTIONS (ENGLISH)            TIME SPENT ON DISCHARGE: 5 minutes

## 2024-02-22 ENCOUNTER — LAB VISIT (OUTPATIENT)
Dept: LAB | Facility: HOSPITAL | Age: 64
End: 2024-02-22
Attending: UROLOGY
Payer: MEDICARE

## 2024-02-22 DIAGNOSIS — N40.1 ENLARGED PROSTATE WITH URINARY OBSTRUCTION: Primary | ICD-10-CM

## 2024-02-22 DIAGNOSIS — N13.8 ENLARGED PROSTATE WITH URINARY OBSTRUCTION: Primary | ICD-10-CM

## 2024-02-22 LAB
ANION GAP SERPL CALC-SCNC: 6 MEQ/L (ref 2–13)
BUN SERPL-MCNC: 10 MG/DL (ref 7–20)
CALCIUM SERPL-MCNC: 9 MG/DL (ref 8.4–10.2)
CHLORIDE SERPL-SCNC: 99 MMOL/L (ref 98–110)
CO2 SERPL-SCNC: 33 MMOL/L (ref 21–32)
CREAT SERPL-MCNC: 1.08 MG/DL (ref 0.66–1.25)
CREAT/UREA NIT SERPL: 9 (ref 12–20)
GFR SERPLBLD CREATININE-BSD FMLA CKD-EPI: 77 MLS/MIN/1.73/M2
GLUCOSE SERPL-MCNC: 100 MG/DL (ref 70–115)
POTASSIUM SERPL-SCNC: 4.2 MMOL/L (ref 3.5–5.1)
PSA SERPL-MCNC: 0.68 NG/ML
SODIUM SERPL-SCNC: 138 MMOL/L (ref 135–145)

## 2024-02-22 PROCEDURE — 84153 ASSAY OF PSA TOTAL: CPT

## 2024-02-22 PROCEDURE — 36415 COLL VENOUS BLD VENIPUNCTURE: CPT

## 2024-02-22 PROCEDURE — 80048 BASIC METABOLIC PNL TOTAL CA: CPT

## 2024-04-15 ENCOUNTER — LAB VISIT (OUTPATIENT)
Dept: LAB | Facility: HOSPITAL | Age: 64
End: 2024-04-15
Attending: INTERNAL MEDICINE
Payer: MEDICARE

## 2024-04-15 DIAGNOSIS — E11.9 DIABETES MELLITUS WITHOUT COMPLICATION: Primary | ICD-10-CM

## 2024-04-15 DIAGNOSIS — G40.909 EPILEPSY: Primary | ICD-10-CM

## 2024-04-15 LAB
ALBUMIN SERPL-MCNC: 4.1 G/DL (ref 3.4–5)
ALBUMIN/GLOB SERPL: 1.6 RATIO
ALP SERPL-CCNC: 50 UNIT/L (ref 50–144)
ALT SERPL-CCNC: 16 UNIT/L (ref 1–45)
ANION GAP SERPL CALC-SCNC: 4 MEQ/L (ref 2–13)
AST SERPL-CCNC: 21 UNIT/L (ref 17–59)
BASOPHILS # BLD AUTO: 0.03 X10(3)/MCL (ref 0.01–0.08)
BASOPHILS NFR BLD AUTO: 0.8 % (ref 0.1–1.2)
BILIRUB SERPL-MCNC: 0.6 MG/DL (ref 0–1)
BUN SERPL-MCNC: 15 MG/DL (ref 7–20)
CALCIUM SERPL-MCNC: 9.2 MG/DL (ref 8.4–10.2)
CHLORIDE SERPL-SCNC: 97 MMOL/L (ref 98–110)
CHOLEST SERPL-MCNC: 233 MG/DL (ref 0–200)
CO2 SERPL-SCNC: 36 MMOL/L (ref 21–32)
CREAT SERPL-MCNC: 1.03 MG/DL (ref 0.66–1.25)
CREAT/UREA NIT SERPL: 15 (ref 12–20)
EOSINOPHIL # BLD AUTO: 0.03 X10(3)/MCL (ref 0.04–0.54)
EOSINOPHIL NFR BLD AUTO: 0.8 % (ref 0.7–7)
ERYTHROCYTE [DISTWIDTH] IN BLOOD BY AUTOMATED COUNT: 11.8 %
EST. AVERAGE GLUCOSE BLD GHB EST-MCNC: 99.7 MG/DL (ref 70–115)
GFR SERPLBLD CREATININE-BSD FMLA CKD-EPI: 82 MLS/MIN/1.73/M2
GLOBULIN SER-MCNC: 2.6 GM/DL (ref 2–3.9)
GLUCOSE SERPL-MCNC: 121 MG/DL (ref 70–115)
HBA1C MFR BLD: 5.1 % (ref 4–6)
HCT VFR BLD AUTO: 37.6 % (ref 36–52)
HDLC SERPL-MCNC: 64 MG/DL (ref 40–60)
HGB BLD-MCNC: 13.5 G/DL (ref 13–18)
IMM GRANULOCYTES # BLD AUTO: 0.02 X10(3)/MCL (ref 0–0.03)
IMM GRANULOCYTES NFR BLD AUTO: 0.5 % (ref 0–0.5)
LDLC SERPL DIRECT ASSAY-SCNC: 129.9 MG/DL (ref 30–100)
LYMPHOCYTES # BLD AUTO: 1.1 X10(3)/MCL (ref 1.32–3.57)
LYMPHOCYTES NFR BLD AUTO: 28.6 % (ref 20–55)
MCH RBC QN AUTO: 32.5 PG (ref 27–34)
MCHC RBC AUTO-ENTMCNC: 35.9 G/DL (ref 31–37)
MCV RBC AUTO: 90.6 FL (ref 79–99)
MONOCYTES # BLD AUTO: 0.37 X10(3)/MCL (ref 0.3–0.82)
MONOCYTES NFR BLD AUTO: 9.6 % (ref 4.7–12.5)
NEUTROPHILS # BLD AUTO: 2.3 X10(3)/MCL (ref 1.78–5.38)
NEUTROPHILS NFR BLD AUTO: 59.7 % (ref 37–73)
NRBC BLD AUTO-RTO: 0 %
PLATELET # BLD AUTO: 140 X10(3)/MCL (ref 140–371)
PMV BLD AUTO: 9.1 FL (ref 9.4–12.4)
POTASSIUM SERPL-SCNC: 4.3 MMOL/L (ref 3.5–5.1)
PROT SERPL-MCNC: 6.7 GM/DL (ref 6.3–8.2)
RBC # BLD AUTO: 4.15 X10(6)/MCL (ref 4–6)
SODIUM SERPL-SCNC: 137 MMOL/L (ref 135–145)
T4 FREE SERPL-MCNC: 0.97 NG/DL (ref 0.78–2.19)
TRIGL SERPL-MCNC: 137 MG/DL (ref 30–200)
TSH SERPL-ACNC: 1.33 UIU/ML (ref 0.36–3.74)
WBC # SPEC AUTO: 3.85 X10(3)/MCL (ref 4–11.5)

## 2024-04-15 PROCEDURE — 85025 COMPLETE CBC W/AUTO DIFF WBC: CPT

## 2024-04-15 PROCEDURE — 80053 COMPREHEN METABOLIC PANEL: CPT

## 2024-04-15 PROCEDURE — 80061 LIPID PANEL: CPT

## 2024-04-15 PROCEDURE — 84439 ASSAY OF FREE THYROXINE: CPT

## 2024-04-15 PROCEDURE — 83036 HEMOGLOBIN GLYCOSYLATED A1C: CPT

## 2024-04-15 PROCEDURE — 84443 ASSAY THYROID STIM HORMONE: CPT

## 2024-04-15 PROCEDURE — 36415 COLL VENOUS BLD VENIPUNCTURE: CPT

## 2024-04-16 DIAGNOSIS — Z87.891 PERSONAL HISTORY OF SMOKING: Primary | ICD-10-CM

## 2024-04-19 ENCOUNTER — HOSPITAL ENCOUNTER (OUTPATIENT)
Dept: RADIOLOGY | Facility: HOSPITAL | Age: 64
Discharge: HOME | End: 2024-04-19
Attending: FAMILY MEDICINE
Payer: MEDICARE

## 2024-04-19 DIAGNOSIS — G40.909 EPILEPSY: ICD-10-CM

## 2024-04-19 DIAGNOSIS — Z87.891 PERSONAL HISTORY OF SMOKING: ICD-10-CM

## 2024-04-19 PROCEDURE — 71271 CT THORAX LUNG CANCER SCR C-: CPT | Mod: TC

## 2024-04-19 PROCEDURE — 70450 CT HEAD/BRAIN W/O DYE: CPT | Mod: TC

## 2024-10-17 ENCOUNTER — HOSPITAL ENCOUNTER (EMERGENCY)
Facility: HOSPITAL | Age: 64
Discharge: HOME | End: 2024-10-18
Payer: MEDICARE

## 2024-10-17 VITALS
HEART RATE: 93 BPM | DIASTOLIC BLOOD PRESSURE: 84 MMHG | RESPIRATION RATE: 18 BRPM | HEIGHT: 67 IN | TEMPERATURE: 98 F | BODY MASS INDEX: 42.53 KG/M2 | WEIGHT: 271 LBS | OXYGEN SATURATION: 98 % | SYSTOLIC BLOOD PRESSURE: 157 MMHG

## 2024-10-17 DIAGNOSIS — N30.00 ACUTE CYSTITIS WITHOUT HEMATURIA: Primary | ICD-10-CM

## 2024-10-17 DIAGNOSIS — R11.0 NAUSEA: ICD-10-CM

## 2024-10-17 LAB
ALBUMIN SERPL-MCNC: 4.1 G/DL (ref 3.4–5)
ALBUMIN/GLOB SERPL: 1.6 RATIO
ALP SERPL-CCNC: 49 UNIT/L (ref 50–144)
ALT SERPL-CCNC: 15 UNIT/L (ref 1–45)
ANION GAP SERPL CALC-SCNC: 6 MEQ/L (ref 2–13)
AST SERPL-CCNC: 21 UNIT/L (ref 17–59)
BACTERIA #/AREA URNS AUTO: ABNORMAL /HPF
BASOPHILS # BLD AUTO: 0.04 X10(3)/MCL (ref 0.01–0.08)
BASOPHILS NFR BLD AUTO: 0.7 % (ref 0.1–1.2)
BILIRUB SERPL-MCNC: 0.6 MG/DL (ref 0–1)
BILIRUB UR QL STRIP.AUTO: ABNORMAL
BUN SERPL-MCNC: 8 MG/DL (ref 7–20)
CALCIUM SERPL-MCNC: 9.4 MG/DL (ref 8.4–10.2)
CHLORIDE SERPL-SCNC: 101 MMOL/L (ref 98–110)
CLARITY UR: ABNORMAL
CO2 SERPL-SCNC: 31 MMOL/L (ref 21–32)
COLOR UR AUTO: YELLOW
CREAT SERPL-MCNC: 1.06 MG/DL (ref 0.66–1.25)
CREAT/UREA NIT SERPL: 8 (ref 12–20)
EOSINOPHIL # BLD AUTO: 0.06 X10(3)/MCL (ref 0.04–0.54)
EOSINOPHIL NFR BLD AUTO: 1.1 % (ref 0.7–7)
ERYTHROCYTE [DISTWIDTH] IN BLOOD BY AUTOMATED COUNT: 11.5 %
GFR SERPLBLD CREATININE-BSD FMLA CKD-EPI: 78 ML/MIN/1.73/M2
GLOBULIN SER-MCNC: 2.6 GM/DL (ref 2–3.9)
GLUCOSE SERPL-MCNC: 123 MG/DL (ref 70–115)
GLUCOSE UR QL STRIP: NEGATIVE
HCT VFR BLD AUTO: 38.7 % (ref 36–52)
HGB BLD-MCNC: 14.3 G/DL (ref 13–18)
HGB UR QL STRIP: ABNORMAL
HYALINE CASTS URNS QL MICRO: ABNORMAL /LPF
IMM GRANULOCYTES # BLD AUTO: 0.01 X10(3)/MCL (ref 0–0.03)
IMM GRANULOCYTES NFR BLD AUTO: 0.2 % (ref 0–0.5)
KETONES UR QL STRIP: ABNORMAL
LEUKOCYTE ESTERASE UR QL STRIP: ABNORMAL
LIPASE SERPL-CCNC: 28 U/L (ref 23–300)
LYMPHOCYTES # BLD AUTO: 1.38 X10(3)/MCL (ref 1.32–3.57)
LYMPHOCYTES NFR BLD AUTO: 25.7 % (ref 20–55)
MCH RBC QN AUTO: 32.9 PG (ref 27–34)
MCHC RBC AUTO-ENTMCNC: 37 G/DL (ref 31–37)
MCV RBC AUTO: 89.2 FL (ref 79–99)
MONOCYTES # BLD AUTO: 0.46 X10(3)/MCL (ref 0.3–0.82)
MONOCYTES NFR BLD AUTO: 8.6 % (ref 4.7–12.5)
NEUTROPHILS # BLD AUTO: 3.42 X10(3)/MCL (ref 1.78–5.38)
NEUTROPHILS NFR BLD AUTO: 63.7 % (ref 37–73)
NITRITE UR QL STRIP: NEGATIVE
NRBC BLD AUTO-RTO: 0 %
PH UR STRIP: 6 [PH]
PLATELET # BLD AUTO: 156 X10(3)/MCL (ref 140–371)
PMV BLD AUTO: 9.2 FL (ref 9.4–12.4)
POTASSIUM SERPL-SCNC: 3.6 MMOL/L (ref 3.5–5.1)
PROT SERPL-MCNC: 6.7 GM/DL (ref 6.3–8.2)
PROT UR QL STRIP: 100
RBC # BLD AUTO: 4.34 X10(6)/MCL (ref 4–6)
RBC #/AREA URNS AUTO: ABNORMAL /HPF
SODIUM SERPL-SCNC: 138 MMOL/L (ref 136–145)
SP GR UR STRIP.AUTO: >=1.03 (ref 1–1.03)
SQUAMOUS #/AREA URNS AUTO: ABNORMAL /HPF
UROBILINOGEN UR STRIP-ACNC: 0.2
WBC # BLD AUTO: 5.37 X10(3)/MCL (ref 4–11.5)
WBC #/AREA URNS AUTO: ABNORMAL /HPF

## 2024-10-17 PROCEDURE — 81015 MICROSCOPIC EXAM OF URINE: CPT

## 2024-10-17 PROCEDURE — 99285 EMERGENCY DEPT VISIT HI MDM: CPT | Mod: 25

## 2024-10-17 PROCEDURE — 63600175 PHARM REV CODE 636 W HCPCS

## 2024-10-17 PROCEDURE — 87086 URINE CULTURE/COLONY COUNT: CPT

## 2024-10-17 PROCEDURE — 80053 COMPREHEN METABOLIC PANEL: CPT

## 2024-10-17 PROCEDURE — 81003 URINALYSIS AUTO W/O SCOPE: CPT

## 2024-10-17 PROCEDURE — 25000003 PHARM REV CODE 250

## 2024-10-17 PROCEDURE — 96374 THER/PROPH/DIAG INJ IV PUSH: CPT

## 2024-10-17 PROCEDURE — 83690 ASSAY OF LIPASE: CPT

## 2024-10-17 PROCEDURE — 85025 COMPLETE CBC W/AUTO DIFF WBC: CPT

## 2024-10-17 PROCEDURE — 96375 TX/PRO/DX INJ NEW DRUG ADDON: CPT

## 2024-10-17 RX ORDER — ONDANSETRON 4 MG/1
8 TABLET, ORALLY DISINTEGRATING ORAL
Status: COMPLETED | OUTPATIENT
Start: 2024-10-17 | End: 2024-10-17

## 2024-10-17 RX ORDER — DEXAMETHASONE SODIUM PHOSPHATE 4 MG/ML
8 INJECTION, SOLUTION INTRA-ARTICULAR; INTRALESIONAL; INTRAMUSCULAR; INTRAVENOUS; SOFT TISSUE
Status: DISCONTINUED | OUTPATIENT
Start: 2024-10-18 | End: 2024-10-17

## 2024-10-17 RX ORDER — PROCHLORPERAZINE EDISYLATE 5 MG/ML
5 INJECTION INTRAMUSCULAR; INTRAVENOUS
Status: COMPLETED | OUTPATIENT
Start: 2024-10-17 | End: 2024-10-17

## 2024-10-17 RX ORDER — CEFTRIAXONE 1 G/1
2 INJECTION, POWDER, FOR SOLUTION INTRAMUSCULAR; INTRAVENOUS
Status: COMPLETED | OUTPATIENT
Start: 2024-10-17 | End: 2024-10-17

## 2024-10-17 RX ADMIN — CEFTRIAXONE SODIUM 2 G: 1 INJECTION, POWDER, FOR SOLUTION INTRAMUSCULAR; INTRAVENOUS at 11:10

## 2024-10-17 RX ADMIN — PROCHLORPERAZINE EDISYLATE 5 MG: 5 INJECTION INTRAMUSCULAR; INTRAVENOUS at 11:10

## 2024-10-17 RX ADMIN — ONDANSETRON 8 MG: 4 TABLET, ORALLY DISINTEGRATING ORAL at 10:10

## 2024-10-18 PROBLEM — R11.0 NAUSEA: Status: ACTIVE | Noted: 2024-10-18

## 2024-10-18 PROBLEM — N30.00 ACUTE CYSTITIS WITHOUT HEMATURIA: Status: ACTIVE | Noted: 2024-10-18

## 2024-10-18 RX ORDER — CIPROFLOXACIN 500 MG/1
500 TABLET ORAL EVERY 12 HOURS
Qty: 20 TABLET | Refills: 0 | Status: SHIPPED | OUTPATIENT
Start: 2024-10-18 | End: 2024-10-28

## 2024-10-18 RX ORDER — PROCHLORPERAZINE MALEATE 10 MG
10 TABLET ORAL EVERY 6 HOURS PRN
Qty: 15 TABLET | Refills: 0 | Status: SHIPPED | OUTPATIENT
Start: 2024-10-18

## 2024-10-18 NOTE — ED PROVIDER NOTES
Encounter Date: 10/17/2024       History     Chief Complaint   Patient presents with    Nausea     N/V/D/C onset x1 month    Urinary Retention     Reports urinary retention and burning onset tonight     64-year-old male presents complaining of urinary urgency with small volumes.  He feels that he is having urinary retention.  He is also nauseated.  He has chronic ongoing bowel issues, including chronic constipation.    The history is provided by the patient and the spouse.     Review of patient's allergies indicates:   Allergen Reactions    Penicillins Swelling     Past Medical History:   Diagnosis Date    Arthritis     COPD (chronic obstructive pulmonary disease)     Diabetes mellitus     Hypertension      Past Surgical History:   Procedure Laterality Date    ANKLE SURGERY Left     BACK SURGERY      CHOLECYSTECTOMY      EYE SURGERY      NECK SURGERY N/A     NOSE SURGERY      TESTICLE SURGERY Left      No family history on file.  Social History     Tobacco Use    Smoking status: Every Day     Current packs/day: 0.25     Average packs/day: 0.3 packs/day for 20.0 years (5.0 ttl pk-yrs)     Types: Cigarettes, Vaping with nicotine     Start date: 1998    Smokeless tobacco: Former   Substance Use Topics    Alcohol use: Not Currently    Drug use: Never     Review of Systems   Constitutional:  Negative for fever.   HENT:  Negative for sore throat.    Respiratory:  Negative for shortness of breath.    Cardiovascular:  Negative for chest pain.   Gastrointestinal:  Positive for constipation and nausea.   Genitourinary:  Positive for decreased urine volume, difficulty urinating, dysuria and urgency.   Musculoskeletal:  Negative for back pain.   Skin:  Negative for rash.   Neurological:  Negative for weakness.   Hematological:  Does not bruise/bleed easily.   All other systems reviewed and are negative.      Physical Exam     Initial Vitals [10/17/24 2215]   BP Pulse Resp Temp SpO2   (!) 157/84 93 18 97.9 °F (36.6 °C) 98 %       MAP       --         Physical Exam    Nursing note and vitals reviewed.  Constitutional: Vital signs are normal. He appears well-developed and well-nourished. He is cooperative.   HENT:   Head: Normocephalic and atraumatic. Mouth/Throat: Oropharynx is clear and moist.   Eyes: Conjunctivae, EOM and lids are normal. Pupils are equal, round, and reactive to light.   Neck: Trachea normal. Neck supple.   Normal range of motion.  Cardiovascular:  Normal rate, regular rhythm, normal heart sounds and intact distal pulses.           Pulmonary/Chest: Breath sounds normal.   Abdominal: Abdomen is soft. Bowel sounds are normal. He exhibits no distension and no mass. There is no abdominal tenderness. There is no rebound and no guarding.   Musculoskeletal:         General: Normal range of motion.      Cervical back: Normal, normal range of motion and neck supple.      Lumbar back: Normal.     Neurological: He is alert and oriented to person, place, and time. He has normal strength. Coordination normal. GCS score is 15. GCS eye subscore is 4. GCS verbal subscore is 5. GCS motor subscore is 6.   Skin: Skin is warm, dry and intact. Capillary refill takes less than 2 seconds.   Psychiatric: He has a normal mood and affect. His speech is normal and behavior is normal. Judgment and thought content normal. Cognition and memory are normal.         ED Course   Procedures  Labs Reviewed   COMPREHENSIVE METABOLIC PANEL - Abnormal       Result Value    Sodium 138      Potassium 3.6      Chloride 101      CO2 31      Glucose 123 (*)     Blood Urea Nitrogen 8      Creatinine 1.06      Calcium 9.4      Protein Total 6.7      Albumin 4.1      Globulin 2.6      Albumin/Globulin Ratio 1.6      Bilirubin Total 0.6      ALP 49 (*)     ALT 15      AST 21      eGFR 78      Anion Gap 6.0      BUN/Creatinine Ratio 8 (*)    URINALYSIS, REFLEX TO URINE CULTURE - Abnormal    Color, UA Yellow      Appearance, UA Cloudy (*)     Specific Gravity, UA >=1.030       pH, UA 6.0      Protein,  (*)     Glucose, UA Negative      Ketones, UA Trace (*)     Blood, UA Moderate (*)     Bilirubin, UA Small (*)     Urobilinogen, UA 0.2      Nitrites, UA Negative      Leukocyte Esterase, UA Small (*)     Narrative:      URINE STABILITY IS 2 HOURS AT ROOM TEMP OR    SIX HOURS REFRIGERATED. PERFORMING TESTING ON    SPECIMENS GREATER THAN THIS AGE MAY AFFECT THE    FOLLOWING TESTS:    PH          SPECIFIC GRAVITY           BLOOD    CLARITY     BILIRUBIN               UROBILINOGEN   CBC WITH DIFFERENTIAL - Abnormal    WBC 5.37      RBC 4.34      Hgb 14.3      Hct 38.7      MCV 89.2      MCH 32.9      MCHC 37.0      RDW 11.5      Platelet 156      MPV 9.2 (*)     Neut % 63.7      Lymph % 25.7      Mono % 8.6      Eos % 1.1      Basophil % 0.7      Lymph # 1.38      Neut # 3.42      Mono # 0.46      Eos # 0.06      Baso # 0.04      IG# 0.01      IG% 0.2      NRBC% 0.0     URINALYSIS, MICROSCOPIC - Abnormal    Bacteria, UA Rare      Hyaline Casts, UA Few (*)     RBC, UA 6-10 (*)     WBC, UA 50-99 (*)     Squamous Epithelial Cells, UA Occasional     LIPASE - Normal    Lipase Level 28     CULTURE, URINE   CBC W/ AUTO DIFFERENTIAL    Narrative:     The following orders were created for panel order CBC W/ AUTO DIFFERENTIAL.  Procedure                               Abnormality         Status                     ---------                               -----------         ------                     CBC with Differential[[id number]]       Abnormal            Final result                 Please view results for these tests on the individual orders.          Imaging Results              CT Abdomen Pelvis  Without Contrast (Preliminary result)  Result time 10/18/24 00:18:16      Preliminary result by Ervin Verma MD (10/18/24 00:18:16)                   Narrative:    START OF REPORT:  Technique: CT of the abdomen and pelvis was performed with axial images as well as sagittal and coronal  reconstruction images without intravenous contrast. CT of the abdomen and pelvis was performed with axial images as well as sagittal and coronal reconstruction images with intravenous contrast but without oral contrast.    Comparison: Comparison is with study [id number] 19:23:05.    Clinical History: ABD PN NVD. Pls send complete sagittal images - ABD PN NVD.    Dosage Information: Automated Exposure Control was utilized.    Findings:  Lines and Tubes: None.  Thorax:  Lungs: Mild streaky hazy and linear opacity is present at the visualized lung bases, consistent with nonspecific dependent changes scarring and subsegmental atelectasis. No focal infiltrate or consolidation is seen.  Pleura: No effusions or thickening.  Heart: The heart size is within normal limits.  Abdomen:  Abdominal Wall: No abdominal wall pathology is seen.  Liver: The liver appears unremarkable.  Biliary System: No intrahepatic or extrahepatic biliary duct dilatation is seen.  Gallbladder: Surgical clips are seen in the gallbladder fossa consistent with prior cholecystectomy.  Pancreas: Mild pancreatic atrophy is seen.  Spleen: The spleen appears unremarkable.  Adrenals: The adrenal glands appear unremarkable.  Kidneys: The kidneys appear unremarkable with no stones cysts masses or hydronephrosis.  Aorta: There is mild scattered calcification of the abdominal aorta and its branches.  IVC: Unremarkable.  Bowel:  Esophagus: There is a small hiatal hernia.  Stomach: The stomach appears unremarkable.  Duodenum: Unremarkable appearing duodenum.  Small Bowel: The small bowel appears unremarkable.  Colon: Nondistended.  Appendix: The appendix appears unremarkable and is seen on Image 42, Series 3 through Image 48, Series 3.  Peritoneum: No intraperitoneal free air or ascites is seen.    Pelvis:  Bladder: The bladder is nondistended and cannot be definitively evaluated.  Male:  Prostate gland: The prostate gland appears unremarkable.    Bony  structures:  Dorsal Spine: There is mild spondylosis of the visualized dorsal spine. There is a stable chronic appearing severe compression deformity of L1 with vertebroblasty in place.  Bony Pelvis: There is mild degenerative change of the hip.      Impression:  1. No acute intraabdominal or pelvic solid organ or bowel pathology identified. Details and other findings as discussed above.                                         Medications   ondansetron disintegrating tablet 8 mg (8 mg Oral Given 10/17/24 9094)   cefTRIAXone injection 2 g (2 g Intravenous Given 10/17/24 2343)   prochlorperazine injection Soln 5 mg (5 mg Intravenous Given 10/17/24 2349)     Medical Decision Making  Urinary retention, nausea, chronic constipation  Differential diagnosis:  BPH, UTI, ureterolithiasis, fecal impaction  Zofran, Garcia  Labs, CT    Amount and/or Complexity of Data Reviewed  Labs: ordered.  Radiology: ordered.    Risk  Prescription drug management.                                      Clinical Impression:  Final diagnoses:  [N30.00] Acute cystitis without hematuria (Primary)  [R11.0] Nausea          ED Disposition Condition    Discharge Good          ED Prescriptions       Medication Sig Dispense Start Date End Date Auth. Provider    ciprofloxacin HCl (CIPRO) 500 MG tablet Take 1 tablet (500 mg total) by mouth every 12 (twelve) hours. for 10 days 20 tablet 10/18/2024 10/28/2024 Reagan Cerda MD    prochlorperazine (COMPAZINE) 10 MG tablet Take 1 tablet (10 mg total) by mouth every 6 (six) hours as needed. 15 tablet 10/18/2024 -- Reagan Cerda MD          Follow-up Information       Follow up With Specialties Details Why Contact Info    Denzel Hemphill MD Family Medicine Call today  [address] [facility] [address]  [phone]               Reagan Cerda MD  10/18/24 0024

## 2024-10-20 LAB — BACTERIA UR CULT: NO GROWTH

## 2025-03-13 ENCOUNTER — LAB VISIT (OUTPATIENT)
Dept: LAB | Facility: HOSPITAL | Age: 65
End: 2025-03-13
Attending: FAMILY MEDICINE
Payer: MEDICARE

## 2025-03-13 DIAGNOSIS — Z00.01 ENCOUNTER FOR GENERAL ADULT MEDICAL EXAMINATION WITH ABNORMAL FINDINGS: Primary | ICD-10-CM

## 2025-03-13 DIAGNOSIS — E11.9 DIABETES MELLITUS WITHOUT COMPLICATION: ICD-10-CM

## 2025-03-13 DIAGNOSIS — F31.10 MANIC BIPOLAR I DISORDER: ICD-10-CM

## 2025-03-13 DIAGNOSIS — I10 ESSENTIAL HYPERTENSION, MALIGNANT: ICD-10-CM

## 2025-03-13 LAB
ALBUMIN SERPL-MCNC: 4.2 G/DL (ref 3.4–5)
ALBUMIN/GLOB SERPL: 1.8 RATIO
ALP SERPL-CCNC: 50 UNIT/L (ref 50–144)
ALT SERPL-CCNC: 11 UNIT/L (ref 1–45)
ANION GAP SERPL CALC-SCNC: 4 MEQ/L (ref 2–13)
AST SERPL-CCNC: 21 UNIT/L (ref 17–59)
BASOPHILS # BLD AUTO: 0.03 X10(3)/MCL (ref 0.01–0.08)
BASOPHILS NFR BLD AUTO: 0.5 % (ref 0.1–1.2)
BILIRUB SERPL-MCNC: 0.9 MG/DL (ref 0–1)
BUN SERPL-MCNC: 11 MG/DL (ref 7–20)
CALCIUM SERPL-MCNC: 9.2 MG/DL (ref 8.4–10.2)
CHLORIDE SERPL-SCNC: 101 MMOL/L (ref 98–110)
CHOLEST SERPL-MCNC: 225 MG/DL (ref 0–200)
CO2 SERPL-SCNC: 32 MMOL/L (ref 21–32)
CREAT SERPL-MCNC: 0.89 MG/DL (ref 0.66–1.25)
CREAT UR-MCNC: 72.6 MG/DL (ref 63–166)
CREAT/UREA NIT SERPL: 12 (ref 12–20)
EOSINOPHIL # BLD AUTO: 0.05 X10(3)/MCL (ref 0.04–0.54)
EOSINOPHIL NFR BLD AUTO: 0.8 % (ref 0.7–7)
ERYTHROCYTE [DISTWIDTH] IN BLOOD BY AUTOMATED COUNT: 11.6 %
GFR SERPLBLD CREATININE-BSD FMLA CKD-EPI: >90 ML/MIN/1.73/M2
GLOBULIN SER-MCNC: 2.4 GM/DL (ref 2–3.9)
GLUCOSE SERPL-MCNC: 114 MG/DL (ref 70–115)
HCT VFR BLD AUTO: 39.5 % (ref 36–52)
HDLC SERPL-MCNC: 64 MG/DL (ref 40–60)
HGB BLD-MCNC: 14.1 G/DL (ref 13–18)
IMM GRANULOCYTES # BLD AUTO: 0.03 X10(3)/MCL (ref 0–0.03)
IMM GRANULOCYTES NFR BLD AUTO: 0.5 % (ref 0–0.5)
LDLC SERPL DIRECT ASSAY-SCNC: 137.7 MG/DL (ref 30–100)
LYMPHOCYTES # BLD AUTO: 0.9 X10(3)/MCL (ref 1.32–3.57)
LYMPHOCYTES NFR BLD AUTO: 14.8 % (ref 20–55)
MCH RBC QN AUTO: 32.4 PG (ref 27–34)
MCHC RBC AUTO-ENTMCNC: 35.7 G/DL (ref 31–37)
MCV RBC AUTO: 90.8 FL (ref 79–99)
MICROALBUMIN UR-MCNC: <5 UG/ML
MICROALBUMIN/CREAT RATIO PNL UR: NORMAL
MONOCYTES # BLD AUTO: 0.54 X10(3)/MCL (ref 0.3–0.82)
MONOCYTES NFR BLD AUTO: 8.9 % (ref 4.7–12.5)
NEUTROPHILS # BLD AUTO: 4.54 X10(3)/MCL (ref 1.78–5.38)
NEUTROPHILS NFR BLD AUTO: 74.5 % (ref 37–73)
NRBC BLD AUTO-RTO: 0 %
PLATELET # BLD AUTO: 145 X10(3)/MCL (ref 140–371)
PMV BLD AUTO: 9.3 FL (ref 9.4–12.4)
POTASSIUM SERPL-SCNC: 4.2 MMOL/L (ref 3.5–5.1)
PROT SERPL-MCNC: 6.6 GM/DL (ref 6.3–8.2)
RBC # BLD AUTO: 4.35 X10(6)/MCL (ref 4–6)
SODIUM SERPL-SCNC: 137 MMOL/L (ref 136–145)
TRIGL SERPL-MCNC: 168 MG/DL (ref 30–200)
TSH SERPL-ACNC: 1.84 UIU/ML (ref 0.36–3.74)
WBC # BLD AUTO: 6.09 X10(3)/MCL (ref 4–11.5)

## 2025-03-13 PROCEDURE — 36415 COLL VENOUS BLD VENIPUNCTURE: CPT

## 2025-03-13 PROCEDURE — 80053 COMPREHEN METABOLIC PANEL: CPT

## 2025-03-13 PROCEDURE — 82570 ASSAY OF URINE CREATININE: CPT

## 2025-03-13 PROCEDURE — 80061 LIPID PANEL: CPT

## 2025-03-13 PROCEDURE — 85025 COMPLETE CBC W/AUTO DIFF WBC: CPT

## 2025-03-13 PROCEDURE — 84443 ASSAY THYROID STIM HORMONE: CPT

## 2025-03-31 DIAGNOSIS — Z87.891 HISTORY OF TOBACCO USE: Primary | ICD-10-CM

## 2025-04-21 ENCOUNTER — HOSPITAL ENCOUNTER (OUTPATIENT)
Dept: RADIOLOGY | Facility: HOSPITAL | Age: 65
Discharge: HOME | End: 2025-04-21
Attending: INTERNAL MEDICINE
Payer: MEDICARE

## 2025-04-21 DIAGNOSIS — Z87.891 HISTORY OF TOBACCO USE: ICD-10-CM

## 2025-04-21 PROCEDURE — 71271 CT THORAX LUNG CANCER SCR C-: CPT | Mod: TC

## 2025-04-25 ENCOUNTER — LAB VISIT (OUTPATIENT)
Dept: LAB | Facility: HOSPITAL | Age: 65
End: 2025-04-25
Attending: UROLOGY
Payer: MEDICARE

## 2025-04-25 DIAGNOSIS — N40.1 ENLARGED PROSTATE WITH URINARY OBSTRUCTION: Primary | ICD-10-CM

## 2025-04-25 DIAGNOSIS — N13.8 ENLARGED PROSTATE WITH URINARY OBSTRUCTION: Primary | ICD-10-CM

## 2025-04-25 LAB
ANION GAP SERPL CALC-SCNC: 3 MEQ/L (ref 2–13)
BUN SERPL-MCNC: 13 MG/DL (ref 7–20)
CALCIUM SERPL-MCNC: 9.1 MG/DL (ref 8.4–10.2)
CHLORIDE SERPL-SCNC: 100 MMOL/L (ref 98–110)
CO2 SERPL-SCNC: 32 MMOL/L (ref 21–32)
CREAT SERPL-MCNC: 0.93 MG/DL (ref 0.66–1.25)
CREAT/UREA NIT SERPL: 14 (ref 12–20)
GFR SERPLBLD CREATININE-BSD FMLA CKD-EPI: >90 ML/MIN/1.73/M2
GLUCOSE SERPL-MCNC: 154 MG/DL (ref 70–115)
POTASSIUM SERPL-SCNC: 4.2 MMOL/L (ref 3.5–5.1)
PSA SERPL-MCNC: 0.38 NG/ML
SODIUM SERPL-SCNC: 135 MMOL/L (ref 136–145)

## 2025-04-25 PROCEDURE — 36415 COLL VENOUS BLD VENIPUNCTURE: CPT

## 2025-04-25 PROCEDURE — 80048 BASIC METABOLIC PNL TOTAL CA: CPT

## 2025-04-25 PROCEDURE — 84153 ASSAY OF PSA TOTAL: CPT

## 2025-07-21 ENCOUNTER — LAB VISIT (OUTPATIENT)
Dept: LAB | Facility: HOSPITAL | Age: 65
End: 2025-07-21
Attending: SURGERY
Payer: MEDICARE

## 2025-07-21 DIAGNOSIS — N13.8 ENLARGED PROSTATE WITH URINARY OBSTRUCTION: Primary | ICD-10-CM

## 2025-07-21 DIAGNOSIS — R94.5 ABNORMAL RESULTS OF LIVER FUNCTION STUDIES: ICD-10-CM

## 2025-07-21 DIAGNOSIS — R79.1 ABNORMAL COAGULATION PROFILE: ICD-10-CM

## 2025-07-21 DIAGNOSIS — N40.1 ENLARGED PROSTATE WITH URINARY OBSTRUCTION: Primary | ICD-10-CM

## 2025-07-21 LAB
HEMOCCULT SP1 STL QL: POSITIVE
HEMOCCULT SP2 STL QL: POSITIVE
HEMOCCULT SP3 STL QL: NEGATIVE

## 2025-07-21 PROCEDURE — 82270 OCCULT BLOOD FECES: CPT

## 2025-07-25 ENCOUNTER — HOSPITAL ENCOUNTER (OUTPATIENT)
Dept: RADIOLOGY | Facility: HOSPITAL | Age: 65
Discharge: HOME | End: 2025-07-25
Attending: FAMILY MEDICINE
Payer: MEDICARE

## 2025-07-25 DIAGNOSIS — K22.2 STRICTURE AND STENOSIS OF ESOPHAGUS: ICD-10-CM

## 2025-07-25 PROCEDURE — 25500020 PHARM REV CODE 255: Performed by: FAMILY MEDICINE

## 2025-07-25 PROCEDURE — 74220 X-RAY XM ESOPHAGUS 1CNTRST: CPT | Mod: TC

## 2025-07-25 PROCEDURE — A9698 NON-RAD CONTRAST MATERIALNOC: HCPCS | Performed by: FAMILY MEDICINE

## 2025-07-25 RX ADMIN — BARIUM SULFATE 140 ML: 980 POWDER, FOR SUSPENSION ORAL at 08:07

## 2025-07-31 ENCOUNTER — HOSPITAL ENCOUNTER (OUTPATIENT)
Dept: PREADMISSION TESTING | Facility: HOSPITAL | Age: 65
Discharge: HOME | End: 2025-07-31
Attending: SURGERY
Payer: MEDICARE

## 2025-07-31 ENCOUNTER — HOSPITAL ENCOUNTER (OUTPATIENT)
Dept: RADIOLOGY | Facility: HOSPITAL | Age: 65
Discharge: HOME | End: 2025-07-31
Attending: SURGERY
Payer: MEDICARE

## 2025-07-31 VITALS
HEIGHT: 67 IN | BODY MASS INDEX: 42.53 KG/M2 | HEIGHT: 67 IN | WEIGHT: 271 LBS | BODY MASS INDEX: 42.53 KG/M2 | WEIGHT: 271 LBS

## 2025-07-31 DIAGNOSIS — Z01.818 PREOP TESTING: Primary | ICD-10-CM

## 2025-07-31 DIAGNOSIS — R13.10 DIFFICULTY IN SWALLOWING: ICD-10-CM

## 2025-07-31 DIAGNOSIS — R13.10 PROBLEMS WITH SWALLOWING AND MASTICATION: Primary | ICD-10-CM

## 2025-07-31 DIAGNOSIS — Z01.818 PREOP TESTING: ICD-10-CM

## 2025-07-31 DIAGNOSIS — R19.5 ABNORMAL FECES: ICD-10-CM

## 2025-07-31 PROCEDURE — 93005 ELECTROCARDIOGRAM TRACING: CPT

## 2025-07-31 PROCEDURE — 93010 ELECTROCARDIOGRAM REPORT: CPT | Mod: ,,, | Performed by: INTERNAL MEDICINE

## 2025-07-31 PROCEDURE — 71046 X-RAY EXAM CHEST 2 VIEWS: CPT | Mod: TC

## 2025-07-31 RX ORDER — MIDODRINE HYDROCHLORIDE 5 MG/1
2.5 TABLET ORAL DAILY
COMMUNITY

## 2025-07-31 RX ORDER — GLYCOPYRROLATE 0.2 MG/ML
0.2 INJECTION INTRAMUSCULAR; INTRAVENOUS ONCE
OUTPATIENT
Start: 2025-08-06

## 2025-07-31 RX ORDER — DONEPEZIL HYDROCHLORIDE 10 MG/1
10 TABLET, FILM COATED ORAL DAILY
COMMUNITY

## 2025-07-31 RX ORDER — ONDANSETRON 8 MG/1
8 TABLET, ORALLY DISINTEGRATING ORAL EVERY 8 HOURS PRN
COMMUNITY
Start: 2025-05-20

## 2025-07-31 RX ORDER — POLYETHYLENE GLYCOL 3350 17 G/17G
17 POWDER, FOR SOLUTION ORAL DAILY
COMMUNITY

## 2025-07-31 NOTE — DISCHARGE INSTRUCTIONS

## 2025-07-31 NOTE — DISCHARGE INSTRUCTIONS

## 2025-08-01 LAB
OHS QRS DURATION: 96 MS
OHS QTC CALCULATION: 471 MS

## 2025-08-04 ENCOUNTER — HOSPITAL ENCOUNTER (OUTPATIENT)
Dept: RADIOLOGY | Facility: HOSPITAL | Age: 65
Discharge: HOME | End: 2025-08-04
Attending: SURGERY
Payer: MEDICARE

## 2025-08-04 DIAGNOSIS — K92.1 MELENA: ICD-10-CM

## 2025-08-04 PROCEDURE — 78264 GASTRIC EMPTYING IMG STUDY: CPT | Mod: TC

## 2025-08-04 PROCEDURE — A9541 TC99M SULFUR COLLOID: HCPCS | Performed by: SURGERY

## 2025-08-04 RX ORDER — TECHNETIUM TC 99M SULFUR COLLOID 2 MG
1.8 KIT MISCELLANEOUS
Status: COMPLETED | OUTPATIENT
Start: 2025-08-04 | End: 2025-08-04

## 2025-08-04 RX ADMIN — TECHNETIUM TC 99M SULFUR COLLOID 1.8 MILLICURIE: KIT at 10:08

## 2025-08-05 ENCOUNTER — ANESTHESIA EVENT (OUTPATIENT)
Dept: GASTROENTEROLOGY | Facility: HOSPITAL | Age: 65
End: 2025-08-05
Payer: MEDICARE

## 2025-08-05 NOTE — ANESTHESIA PREPROCEDURE EVALUATION
08/05/2025  Avi Blum is a 64 y.o., male who presents for an EGD.        Anesthesia History     Diabetes mellitus Hypertension   COPD (chronic obstructive pulmonary disease) Arthritis   Chronic back pain Blindness of left eye   GERD (gastroesophageal reflux disease) Postnasal drip   Sleep apnea Memory loss   Impaction of the bowels      Surgical History     EYE SURGERY CHOLECYSTECTOMY   TESTICLE SURGERY ANKLE SURGERY   BACK SURGERY NECK SURGERY   NOSE SURGERY pain stimulator     Substance History     Smoking Status: Every Day - 23.3 pack years   Smokeless Tobacco Status: Former   Alcohol use: Not Currently   Drug use: Marijuana     Problem List   Current as of 08/05/25 0726  Acute cystitis without hematuria   Blood in stool   Constipation   Gastritis   Generalized abdominal pain   Nausea     Prescription Medications  Within last 14 days from 08/05/25   Last Taken Last Updated   baclofen (LIORESAL) 10 MG tablet    Not Taking 07/31/25 1159   baclofen (LIORESAL) 10 MG tablet    Not Taking 07/31/25 1159   busPIRone (BUSPAR) 15 MG tablet    6/8/2023 06/09/23 0951   CHLORPHENIRAMINE MALEATE ORAL        cloNIDine (CATAPRES) 0.1 MG tablet    6/8/2023 06/09/23 0951   docusate sodium (COLACE) 50 MG capsule    Not Taking 07/31/25 1159   donepeziL (ARICEPT) 10 MG tablet        DULoxetine (CYMBALTA) 60 MG capsule    6/8/2023 06/09/23 0951   famotidine (PEPCID) 20 MG tablet    Not Taking 07/31/25 1159   furosemide (LASIX) 40 MG tablet    6/8/2023 06/09/23 0951   gabapentin (NEURONTIN) 300 MG capsule    6/8/2023 06/09/23 0951   glimepiride (AMARYL) 2 MG tablet    Not Taking 07/31/25 1159   HYDROcodone-acetaminophen (NORCO)  mg per tablet    6/8/2023 06/09/23 0951   lactulose (CHRONULAC) 10 gram/15 mL solution    Not Taking 07/31/25 1159   lactulose (CHRONULAC) 20 gram/30 mL Soln    Not Taking 07/31/25 1159    latanoprost 0.005 % dpem        meclizine (ANTIVERT) 25 mg tablet    6/8/2023 06/09/23 0951   meloxicam (MOBIC) 7.5 MG tablet    Not Taking 07/31/25 1159   memantine (NAMENDA) 5 MG Tab    6/8/2023 06/09/23 0951   midodrine (PROAMATINE) 5 MG Tab        omeprazole (PRILOSEC) 20 MG capsule    6/8/2023 06/09/23 0951   ondansetron (ZOFRAN-ODT) 8 MG TbDL        oxyCODONE-acetaminophen (PERCOCET)  mg per tablet    Not Taking 07/31/25 1159   polyethylene glycol (GLYCOLAX) 17 gram PwPk        prochlorperazine (COMPAZINE) 10 MG tablet    Not Taking 07/31/25 1159   QUEtiapine (SEROQUEL) 200 MG Tab    6/8/2023 06/09/23 0951   QUEtiapine (SEROQUEL) 300 MG Tab        ranolazine (RANEXA) 1,000 mg Tb12    6/8/2023 06/09/23 0951   tadalafiL (CIALIS) 5 MG tablet    6/8/2023 06/09/23 0951   tamsulosin (FLOMAX) 0.4 mg Cap    6/8/2023 06/09/23 0951   terbinafine HCL (LAMISIL) 250 mg tablet    Not Taking 07/31/25 1159   traZODone (DESYREL) 150 MG tablet    6/8/2023 06/09/23 0951        EKG 7/31/25    Test Reason : Z01.818,    Vent. Rate :  88 BPM     Atrial Rate :  90 BPM     P-R Int : 160 ms          QRS Dur :  96 ms      QT Int : 390 ms       P-R-T Axes :  56  15  47 degrees    QTcB Int : 471 ms    Normal sinus rhythm with sinus arrhythmia  Normal ECG  No previous ECGs available  Confirmed by Chad Morocho ([id number]) on 8/1/2025 12:21:09 PM    Referred By: CASPER ALONSO           Confirmed By: Chad Morocho     Specimen Collected: 07/31/25 11:48 CDT Last Resulted: 08/01/25 12:21 CDT     Pre-op Assessment    I have reviewed the Patient Summary Reports.     I have reviewed the Nursing Notes. I have reviewed the NPO Status.   I have reviewed the Medications.     Review of Systems  Anesthesia Hx:  No problems with previous Anesthesia                Social:  Smoker       Hematology/Oncology:  Hematology Normal   Oncology Normal                                   EENT/Dental:  EENT/Dental Normal  Vertigo            Cardiovascular:  Exercise tolerance: poor   Hypertension            BOWMAN   Unable to walk from injury                           Pulmonary:   COPD, moderate     Sleep Apnea                Renal/:  Renal/ Normal                 Hepatic/GI:   PUD,  GERD, poorly controlled                Musculoskeletal:  Arthritis   Neck injection  Fusion L4-5 x2       Spine Disorders: lumbar Chronic Pain           Neurological:  Neurology Normal                                      Endocrine:  Diabetes, type 2         Morbid Obesity / BMI > 40  Dermatological:  Skin Normal    Psych:   anxiety depression                Physical Exam  General: Well nourished, Cooperative, Alert and Oriented    Airway:  Mallampati: II / II  Mouth Opening: Normal  TM Distance: Normal  Tongue: Normal  Neck ROM: Normal ROM    Dental:  Edentulous        Anesthesia Plan  Type of Anesthesia, risks & benefits discussed:    Anesthesia Type: Gen Natural Airway, MAC  Intra-op Monitoring Plan: Standard ASA Monitors  Post Op Pain Control Plan: multimodal analgesia  Induction:  IV  Airway Plan: Direct  Informed Consent: Informed consent signed with the Patient and all parties understand the risks and agree with anesthesia plan.  All questions answered. Patient consented to blood products? Yes  ASA Score: 3  Day of Surgery Review of History & Physical: H&P Update referred to the surgeon/provider.I have interviewed and examined the patient. I have reviewed the patient's H&P dated: KSA. There are no significant changes. H&P completed by Anesthesiologist.    Ready For Surgery From Anesthesia Perspective.     .

## 2025-08-06 ENCOUNTER — ANESTHESIA EVENT (OUTPATIENT)
Dept: GASTROENTEROLOGY | Facility: HOSPITAL | Age: 65
End: 2025-08-06
Payer: MEDICARE

## 2025-08-06 ENCOUNTER — HOSPITAL ENCOUNTER (OUTPATIENT)
Dept: GASTROENTEROLOGY | Facility: HOSPITAL | Age: 65
Discharge: HOME | End: 2025-08-06
Attending: SURGERY
Payer: MEDICARE

## 2025-08-06 ENCOUNTER — ANESTHESIA (OUTPATIENT)
Dept: GASTROENTEROLOGY | Facility: HOSPITAL | Age: 65
End: 2025-08-06
Payer: MEDICARE

## 2025-08-06 VITALS
RESPIRATION RATE: 20 BRPM | BODY MASS INDEX: 42.53 KG/M2 | TEMPERATURE: 98 F | OXYGEN SATURATION: 95 % | HEART RATE: 62 BPM | WEIGHT: 270.94 LBS | SYSTOLIC BLOOD PRESSURE: 127 MMHG | DIASTOLIC BLOOD PRESSURE: 76 MMHG | HEIGHT: 67 IN

## 2025-08-06 DIAGNOSIS — R13.10 DIFFICULTY IN SWALLOWING: Primary | ICD-10-CM

## 2025-08-06 DIAGNOSIS — R13.10 DYSPHAGIA, IDIOPATHIC: ICD-10-CM

## 2025-08-06 DIAGNOSIS — R13.10 PROBLEMS WITH SWALLOWING AND MASTICATION: ICD-10-CM

## 2025-08-06 LAB — POCT GLUCOSE: 114 MG/DL (ref 70–110)

## 2025-08-06 PROCEDURE — 63600175 PHARM REV CODE 636 W HCPCS: Performed by: NURSE ANESTHETIST, CERTIFIED REGISTERED

## 2025-08-06 PROCEDURE — S5010 5% DEXTROSE AND 0.45% SALINE: HCPCS | Performed by: SURGERY

## 2025-08-06 PROCEDURE — 37000008 HC ANESTHESIA 1ST 15 MINUTES

## 2025-08-06 PROCEDURE — 37000009 HC ANESTHESIA EA ADD 15 MINS

## 2025-08-06 PROCEDURE — 25000003 PHARM REV CODE 250: Performed by: SURGERY

## 2025-08-06 PROCEDURE — 82962 GLUCOSE BLOOD TEST: CPT

## 2025-08-06 PROCEDURE — 63600175 PHARM REV CODE 636 W HCPCS: Mod: JZ,TB | Performed by: SURGERY

## 2025-08-06 PROCEDURE — 27201423 OPTIME MED/SURG SUP & DEVICES STERILE SUPPLY

## 2025-08-06 RX ORDER — LIDOCAINE HYDROCHLORIDE 20 MG/ML
INJECTION INTRAVENOUS
Status: DISCONTINUED | OUTPATIENT
Start: 2025-08-06 | End: 2025-08-06

## 2025-08-06 RX ORDER — GLYCOPYRROLATE 0.2 MG/ML
0.2 INJECTION INTRAMUSCULAR; INTRAVENOUS ONCE
Status: COMPLETED | OUTPATIENT
Start: 2025-08-06 | End: 2025-08-06

## 2025-08-06 RX ORDER — ONDANSETRON HYDROCHLORIDE 2 MG/ML
INJECTION, SOLUTION INTRAVENOUS
Status: DISCONTINUED | OUTPATIENT
Start: 2025-08-06 | End: 2025-08-06

## 2025-08-06 RX ORDER — PROPOFOL 10 MG/ML
VIAL (ML) INTRAVENOUS
Status: DISCONTINUED | OUTPATIENT
Start: 2025-08-06 | End: 2025-08-06

## 2025-08-06 RX ORDER — DEXTROSE MONOHYDRATE AND SODIUM CHLORIDE 5; .45 G/100ML; G/100ML
INJECTION, SOLUTION INTRAVENOUS CONTINUOUS
Status: DISCONTINUED | OUTPATIENT
Start: 2025-08-06 | End: 2025-08-07 | Stop reason: HOSPADM

## 2025-08-06 RX ADMIN — PROPOFOL 40 MG: 10 INJECTION, EMULSION INTRAVENOUS at 09:08

## 2025-08-06 RX ADMIN — GLYCOPYRROLATE 0.2 MG: 0.2 INJECTION INTRAMUSCULAR; INTRAVENOUS at 08:08

## 2025-08-06 RX ADMIN — ONDANSETRON 4 MG: 2 INJECTION INTRAMUSCULAR; INTRAVENOUS at 09:08

## 2025-08-06 RX ADMIN — PROPOFOL 100 MG: 10 INJECTION, EMULSION INTRAVENOUS at 09:08

## 2025-08-06 RX ADMIN — DEXTROSE AND SODIUM CHLORIDE: 5; 450 INJECTION, SOLUTION INTRAVENOUS at 08:08

## 2025-08-06 RX ADMIN — LIDOCAINE HYDROCHLORIDE 50 MG: 20 INJECTION, SOLUTION INTRAVENOUS at 09:08

## 2025-08-06 NOTE — DISCHARGE INSTRUCTIONS
Follow-up with Dr. ALONSO IS ON 08/20/2025 @ 5:45 AM IN [place].  Diet:  BLAND  Activity:  decrease activity today, no driving today, resume all activity tomorrow.  Notify MD:  increased swelling of abdomen, difficulty breathing/swallowing, excessive nausea/vomiting, excessive bright red bleeding from mouth/vomit.  Medications:  continue your home medications, keep a list of your home medications at all times for emergencies.

## 2025-08-06 NOTE — H&P
The patient has been examined and the H&P has been reviewed:    I concur with the findings and no changes have occurred since H&P was written.    Staff present during examination : Elizabeth Arango RN    Patient cleared for Anesthesia: MAC    Anesthesia/Surgery risks, benefits and alternative options discussed and understood by patient/family.    I have reviewed and agree with Anesthesia Plan of Care.    There are no hospital problems to display for this patient.

## 2025-08-06 NOTE — ANESTHESIA POSTPROCEDURE EVALUATION
Anesthesia Post Evaluation    Patient: Avi Blum    Procedure(s) Performed: * No procedures listed *    Final Anesthesia Type: MAC      Patient location during evaluation: OPS  Patient participation: Yes- Able to Participate  Level of consciousness: awake and alert, awake and oriented  Post-procedure vital signs: reviewed and stable  Pain management: adequate  Airway patency: patent    PONV status at discharge: No PONV  Anesthetic complications: no      Cardiovascular status: blood pressure returned to baseline  Respiratory status: unassisted, room air and spontaneous ventilation  Hydration status: euvolemic  Follow-up not needed.              Vitals Value Taken Time   /95 08/06/25 08:14   Temp 36.2 °C (97.1 °F) 08/06/25 08:14   Pulse 72 08/06/25 08:14   Resp 20 08/06/25 08:14   SpO2 97 % 08/06/25 08:14         No case tracking events are documented in the log.      Pain/Emi Score: No data recorded

## 2025-08-06 NOTE — DISCHARGE SUMMARY
[facility]  [facility]    EGD      OUTCOME: Patient tolerated treatment/procedure well without complication and is now ready for discharge.    DISPOSITION: Home or Self Care      FINAL DIAGNOSIS:    Normal duodenal in all 4 portions and into the jejunum   Normal antrum fundus and cardia of the stomach   Cold Biopsy of the antrum taken for histo path and H pylori   Z-line at 41 cm with a 2 cm hiatal hernia   No evidence of any achalasia based on wide open GE junction   Normal esophagus cricopharyngeus muscle vocal cords   Cold biopsy of the Z-line taken for histo path  FOLLOWUP: In clinic 1 week    DISCHARGE INSTRUCTIONS:  No discharge procedures on file.     TIME SPENT ON DISCHARGE:  5 minutes

## 2025-08-06 NOTE — ANESTHESIA PREPROCEDURE EVALUATION
08/06/2025  Avi Blum is a 64 y.o., male who presents for a colonoscopy.    Patient is wheelchair bound due to vertigo and back pain.  Patient and Wife said he has episodes of low blood pressure and only takes his anti hypertensive medication as needed.  He is also limited in physical activity due to his COPD.   He has a CPAP machine but does not use it.  States he isn't currently on medication, diet controlled.       Anesthesia History     Diabetes mellitus Hypertension   COPD (chronic obstructive pulmonary disease) Arthritis   Chronic back pain Blindness of left eye   GERD (gastroesophageal reflux disease) Postnasal drip   Sleep apnea Memory loss   Impaction of the bowels      Surgical History     EYE SURGERY CHOLECYSTECTOMY   TESTICLE SURGERY ANKLE SURGERY   BACK SURGERY NECK SURGERY   NOSE SURGERY pain stimulator     Substance History     Smoking Status: Every Day - 23.3 pack years   Smokeless Tobacco Status: Former   Alcohol use: Not Currently   Drug use: Marijuana     Problem List   Current as of 08/06/25 0739  Acute cystitis without hematuria   Blood in stool   Constipation   Gastritis   Generalized abdominal pain   Nausea     EKG 7/31/25  Test Reason : Z01.818,     Vent. Rate :  88 BPM     Atrial Rate :  90 BPM      P-R Int : 160 ms          QRS Dur :  96 ms       QT Int : 390 ms       P-R-T Axes :  56  15  47 degrees     QTcB Int : 471 ms     Normal sinus rhythm with sinus arrhythmia   Normal ECG   No previous ECGs available   Confirmed by Chad Morocho ([id number]) on 8/1/2025 12:21:09 PM     Referred By: CASPER ALONSO           Confirmed By: Chad Morocho     Prescription Medications  Within last 14 days from 08/06/25   Last Taken Last Updated   baclofen (LIORESAL) 10 MG tablet    Not Taking 07/31/25 1159   baclofen (LIORESAL) 10 MG tablet    Not Taking 07/31/25 1159   busPIRone (BUSPAR) 15 MG  tablet    6/8/2023 06/09/23 0951   CHLORPHENIRAMINE MALEATE ORAL        cloNIDine (CATAPRES) 0.1 MG tablet    6/8/2023 06/09/23 0951   docusate sodium (COLACE) 50 MG capsule    Not Taking 07/31/25 1159   donepeziL (ARICEPT) 10 MG tablet        DULoxetine (CYMBALTA) 60 MG capsule    6/8/2023 06/09/23 0951   famotidine (PEPCID) 20 MG tablet    Not Taking 07/31/25 1159   furosemide (LASIX) 40 MG tablet    6/8/2023 06/09/23 0951   gabapentin (NEURONTIN) 300 MG capsule    6/8/2023 06/09/23 0951   glimepiride (AMARYL) 2 MG tablet    Not Taking 07/31/25 1159   HYDROcodone-acetaminophen (NORCO)  mg per tablet    6/8/2023 06/09/23 0951   lactulose (CHRONULAC) 10 gram/15 mL solution    Not Taking 07/31/25 1159   lactulose (CHRONULAC) 20 gram/30 mL Soln    Not Taking 07/31/25 1159   latanoprost 0.005 % dpem        meclizine (ANTIVERT) 25 mg tablet    6/8/2023 06/09/23 0951   meloxicam (MOBIC) 7.5 MG tablet    Not Taking 07/31/25 1159   memantine (NAMENDA) 5 MG Tab    6/8/2023 06/09/23 0951   midodrine (PROAMATINE) 5 MG Tab        omeprazole (PRILOSEC) 20 MG capsule    6/8/2023 06/09/23 0951   ondansetron (ZOFRAN-ODT) 8 MG TbDL        oxyCODONE-acetaminophen (PERCOCET)  mg per tablet    Not Taking 07/31/25 1159   polyethylene glycol (GLYCOLAX) 17 gram PwPk        prochlorperazine (COMPAZINE) 10 MG tablet    Not Taking 07/31/25 1159   QUEtiapine (SEROQUEL) 200 MG Tab    6/8/2023 06/09/23 0951   QUEtiapine (SEROQUEL) 300 MG Tab        ranolazine (RANEXA) 1,000 mg Tb12    6/8/2023 06/09/23 0951   tadalafiL (CIALIS) 5 MG tablet    6/8/2023 06/09/23 0951   tamsulosin (FLOMAX) 0.4 mg Cap    6/8/2023 06/09/23 0951   terbinafine HCL (LAMISIL) 250 mg tablet    Not Taking 07/31/25 1159   traZODone (DESYREL) 150 MG tablet    6/8/2023 06/09/23 0951          Pre-op Assessment    I have reviewed the Patient Summary Reports.     I have reviewed the Nursing Notes. I have reviewed the NPO Status.   I have reviewed the Medications.      Review of Systems  Anesthesia Hx:  No problems with previous Anesthesia                Social:  Smoker, Recreational Drugs       Hematology/Oncology:  Hematology Normal   Oncology Normal                                   EENT/Dental:  EENT/Dental Normal           Cardiovascular:     Hypertension                                          Pulmonary:   COPD     Sleep Apnea                Renal/:  Renal/ Normal                 Hepatic/GI:     GERD                Musculoskeletal:  Musculoskeletal Normal                Neurological:  Neurology Normal                                      Endocrine:  Diabetes, well controlled           Dermatological:  Skin Normal    Psych:  Psychiatric Normal                    Physical Exam  General: Well nourished, Cooperative, Alert and Oriented    Airway:  Mallampati: II   Mouth Opening: Normal  TM Distance: Normal  Tongue: Normal  Neck ROM: Normal ROM    Dental:  Edentulous    Chest/Lungs:  Clear to auscultation    Heart:  Rate: Normal  Rhythm: Regular Rhythm  Sounds: Normal    Abdomen:  Normal        Anesthesia Plan  Type of Anesthesia, risks & benefits discussed:    Anesthesia Type: Gen Natural Airway, MAC  Intra-op Monitoring Plan: Standard ASA Monitors  Post Op Pain Control Plan: multimodal analgesia  Induction:  IV  Airway Plan: Direct  Informed Consent: Informed consent signed with the Patient and all parties understand the risks and agree with anesthesia plan.  All questions answered. Patient consented to blood products? Yes  ASA Score: 3  Day of Surgery Review of History & Physical: H&P Update referred to the surgeon/provider.I have interviewed and examined the patient. I have reviewed the patient's H&P dated: [facility]. There are no significant changes. H&P completed by Anesthesiologist.    Ready For Surgery From Anesthesia Perspective.     .

## 2025-08-08 ENCOUNTER — ANESTHESIA (OUTPATIENT)
Dept: GASTROENTEROLOGY | Facility: HOSPITAL | Age: 65
End: 2025-08-08
Payer: MEDICARE

## 2025-08-08 ENCOUNTER — HOSPITAL ENCOUNTER (OUTPATIENT)
Dept: GASTROENTEROLOGY | Facility: HOSPITAL | Age: 65
Discharge: HOME | End: 2025-08-08
Attending: SURGERY
Payer: MEDICARE

## 2025-08-08 VITALS
OXYGEN SATURATION: 98 % | HEART RATE: 72 BPM | SYSTOLIC BLOOD PRESSURE: 131 MMHG | RESPIRATION RATE: 20 BRPM | WEIGHT: 271 LBS | DIASTOLIC BLOOD PRESSURE: 87 MMHG | TEMPERATURE: 98 F | HEIGHT: 67 IN | BODY MASS INDEX: 42.53 KG/M2

## 2025-08-08 DIAGNOSIS — R19.5 ABNORMAL FECES: ICD-10-CM

## 2025-08-08 DIAGNOSIS — R19.5 OCCULT BLOOD POSITIVE STOOL: ICD-10-CM

## 2025-08-08 LAB
BEAKER SEE SCANNED REPORT: NORMAL
POCT GLUCOSE: 109 MG/DL (ref 70–110)

## 2025-08-08 PROCEDURE — S5010 5% DEXTROSE AND 0.45% SALINE: HCPCS | Performed by: SURGERY

## 2025-08-08 PROCEDURE — 37000009 HC ANESTHESIA EA ADD 15 MINS

## 2025-08-08 PROCEDURE — 25000003 PHARM REV CODE 250: Performed by: SURGERY

## 2025-08-08 PROCEDURE — 82962 GLUCOSE BLOOD TEST: CPT

## 2025-08-08 PROCEDURE — 63600175 PHARM REV CODE 636 W HCPCS: Performed by: NURSE ANESTHETIST, CERTIFIED REGISTERED

## 2025-08-08 PROCEDURE — 45385 COLONOSCOPY W/LESION REMOVAL: CPT | Mod: PT,KX | Performed by: SURGERY

## 2025-08-08 PROCEDURE — 37000008 HC ANESTHESIA 1ST 15 MINUTES

## 2025-08-08 RX ORDER — ONDANSETRON HYDROCHLORIDE 2 MG/ML
INJECTION, SOLUTION INTRAVENOUS
Status: DISCONTINUED | OUTPATIENT
Start: 2025-08-08 | End: 2025-08-08

## 2025-08-08 RX ORDER — PROPOFOL 10 MG/ML
VIAL (ML) INTRAVENOUS
Status: DISCONTINUED | OUTPATIENT
Start: 2025-08-08 | End: 2025-08-08

## 2025-08-08 RX ORDER — DEXTROSE MONOHYDRATE AND SODIUM CHLORIDE 5; .45 G/100ML; G/100ML
INJECTION, SOLUTION INTRAVENOUS CONTINUOUS
Status: DISCONTINUED | OUTPATIENT
Start: 2025-08-08 | End: 2025-08-09 | Stop reason: HOSPADM

## 2025-08-08 RX ORDER — LIDOCAINE HYDROCHLORIDE 20 MG/ML
INJECTION INTRAVENOUS
Status: DISCONTINUED | OUTPATIENT
Start: 2025-08-08 | End: 2025-08-08

## 2025-08-08 RX ADMIN — DEXTROSE AND SODIUM CHLORIDE: 5; 450 INJECTION, SOLUTION INTRAVENOUS at 07:08

## 2025-08-08 RX ADMIN — PROPOFOL 30 MG: 10 INJECTION, EMULSION INTRAVENOUS at 10:08

## 2025-08-08 RX ADMIN — PROPOFOL 70 MG: 10 INJECTION, EMULSION INTRAVENOUS at 10:08

## 2025-08-08 RX ADMIN — LIDOCAINE HYDROCHLORIDE 50 MG: 20 INJECTION, SOLUTION INTRAVENOUS at 10:08

## 2025-08-08 RX ADMIN — ONDANSETRON 4 MG: 2 INJECTION INTRAMUSCULAR; INTRAVENOUS at 10:08

## 2025-08-08 NOTE — H&P
The patient has been examined and the H&P has been reviewed:    I concur with the findings and no changes have occurred since H&P was written.    Staff present during examination : Elizabeth Aranog RN    Patient cleared for Anesthesia: MAC    Anesthesia/Surgery risks, benefits and alternative options discussed and understood by patient/family.    I have reviewed and agree with Anesthesia Plan of Care.    There are no hospital problems to display for this patient.

## 2025-08-08 NOTE — ANESTHESIA POSTPROCEDURE EVALUATION
Anesthesia Post Evaluation    Patient: Avi Blum    Procedure(s) Performed: * No procedures listed *    Final Anesthesia Type: MAC      Patient location during evaluation: GI PACU  Patient participation: Yes- Able to Participate  Level of consciousness: awake and alert, awake and oriented  Post-procedure vital signs: reviewed and stable  Pain management: adequate  Airway patency: patent    PONV status at discharge: No PONV  Anesthetic complications: no      Cardiovascular status: blood pressure returned to baseline  Respiratory status: unassisted, room air and spontaneous ventilation  Hydration status: euvolemic  Follow-up not needed.              Vitals Value Taken Time   /88 08/08/25 07:30   Temp 36.6 °C (97.8 °F) 08/08/25 07:30   Pulse 77 08/08/25 07:30   Resp 20 08/08/25 07:30   SpO2 99 % 08/08/25 07:30         No case tracking events are documented in the log.      Pain/Emi Score: No data recorded

## 2025-08-08 NOTE — DISCHARGE INSTRUCTIONS
Follow-up with Dr ALONSO   Diet: as tolerated  Activity:  decrease activity today, no driving today, resume all activity tomorrow.  Notify MD:  increased swelling of abdomen, excessive nausea/vomiting, excessive bright red bleeding from rectum.  Medications:  continue your home medications. Keep a list of your home medications at all times for emergencies.